# Patient Record
Sex: FEMALE | Race: OTHER | HISPANIC OR LATINO | Employment: UNEMPLOYED | URBAN - METROPOLITAN AREA
[De-identification: names, ages, dates, MRNs, and addresses within clinical notes are randomized per-mention and may not be internally consistent; named-entity substitution may affect disease eponyms.]

---

## 2020-02-10 ENCOUNTER — HOSPITAL ENCOUNTER (OUTPATIENT)
Dept: RADIOLOGY | Facility: HOSPITAL | Age: 55
Discharge: HOME | End: 2020-02-10
Attending: PODIATRIST
Payer: MEDICAID

## 2020-02-10 DIAGNOSIS — M77.50 ENTHESOPATHY, ANKLE AND TARSUS: ICD-10-CM

## 2020-02-10 DIAGNOSIS — S93.611A SPRAIN OF TARSAL LIGAMENT OF RIGHT FOOT: Primary | ICD-10-CM

## 2020-02-10 DIAGNOSIS — S93.611A SPRAIN OF TARSAL LIGAMENT OF RIGHT FOOT: ICD-10-CM

## 2020-02-10 PROCEDURE — 73630 X-RAY EXAM OF FOOT: CPT | Mod: 26,RT,, | Performed by: RADIOLOGY

## 2020-02-10 PROCEDURE — 73630 X-RAY EXAM OF FOOT: CPT | Mod: TC,FY,RT

## 2020-02-10 PROCEDURE — 73630 XR FOOT COMPLETE 3 VIEW RIGHT: ICD-10-PCS | Mod: 26,RT,, | Performed by: RADIOLOGY

## 2021-09-06 ENCOUNTER — HOSPITAL ENCOUNTER (EMERGENCY)
Facility: HOSPITAL | Age: 56
Discharge: HOME | End: 2021-09-06
Attending: INTERNAL MEDICINE
Payer: MEDICAID

## 2021-09-06 VITALS
HEART RATE: 66 BPM | OXYGEN SATURATION: 100 % | DIASTOLIC BLOOD PRESSURE: 79 MMHG | HEIGHT: 62 IN | RESPIRATION RATE: 19 BRPM | SYSTOLIC BLOOD PRESSURE: 140 MMHG | WEIGHT: 165 LBS | TEMPERATURE: 98 F | BODY MASS INDEX: 30.36 KG/M2

## 2021-09-06 DIAGNOSIS — J30.9 ALLERGIC RHINITIS, UNSPECIFIED SEASONALITY, UNSPECIFIED TRIGGER: ICD-10-CM

## 2021-09-06 DIAGNOSIS — U07.1 COVID-19 VIRUS INFECTION: ICD-10-CM

## 2021-09-06 DIAGNOSIS — H65.192 OTHER NON-RECURRENT ACUTE NONSUPPURATIVE OTITIS MEDIA OF LEFT EAR: Primary | ICD-10-CM

## 2021-09-06 LAB
B-HCG UR QL: NEGATIVE
CTP QC/QA: YES
CTP QC/QA: YES
POC RAPID STREP A: NEGATIVE
SARS-COV-2 RDRP RESP QL NAA+PROBE: NEGATIVE

## 2021-09-06 PROCEDURE — 81025 URINE PREGNANCY TEST: CPT | Mod: ER | Performed by: NURSE PRACTITIONER

## 2021-09-06 PROCEDURE — 25000003 PHARM REV CODE 250: Mod: ER | Performed by: NURSE PRACTITIONER

## 2021-09-06 PROCEDURE — U0002 COVID-19 LAB TEST NON-CDC: HCPCS | Mod: ER | Performed by: NURSE PRACTITIONER

## 2021-09-06 PROCEDURE — 99284 EMERGENCY DEPT VISIT MOD MDM: CPT | Mod: 25,ER

## 2021-09-06 RX ORDER — NYSTATIN AND TRIAMCINOLONE ACETONIDE 100000; 1 [USP'U]/G; MG/G
OINTMENT TOPICAL
COMMUNITY
Start: 2020-09-29 | End: 2021-09-29

## 2021-09-06 RX ORDER — DEXTROMETHORPHAN HYDROBROMIDE, GUAIFENESIN 5; 100 MG/5ML; MG/5ML
650 LIQUID ORAL EVERY 8 HOURS
Qty: 20 TABLET | Refills: 0 | Status: SHIPPED | OUTPATIENT
Start: 2021-09-06

## 2021-09-06 RX ORDER — CYCLOSPORINE 0.5 MG/ML
EMULSION OPHTHALMIC
COMMUNITY
Start: 2021-06-30

## 2021-09-06 RX ORDER — VALSARTAN 160 MG/1
160 TABLET ORAL
COMMUNITY
Start: 2021-07-01

## 2021-09-06 RX ORDER — ROSUVASTATIN CALCIUM 20 MG/1
20 TABLET, COATED ORAL
COMMUNITY
Start: 2021-07-01 | End: 2023-05-21

## 2021-09-06 RX ORDER — CETIRIZINE HYDROCHLORIDE 10 MG/1
10 TABLET ORAL DAILY
Qty: 30 TABLET | Refills: 0 | Status: SHIPPED | OUTPATIENT
Start: 2021-09-06 | End: 2022-09-06

## 2021-09-06 RX ORDER — DICYCLOMINE HYDROCHLORIDE 20 MG/1
20 TABLET ORAL EVERY 6 HOURS
COMMUNITY
Start: 2021-08-19

## 2021-09-06 RX ORDER — MONTELUKAST SODIUM 10 MG/1
10 TABLET ORAL
COMMUNITY
Start: 2021-07-01 | End: 2021-09-29

## 2021-09-06 RX ORDER — BUDESONIDE AND FORMOTEROL FUMARATE DIHYDRATE 160; 4.5 UG/1; UG/1
2 AEROSOL RESPIRATORY (INHALATION) 2 TIMES DAILY
COMMUNITY
Start: 2021-05-19

## 2021-09-06 RX ORDER — AMOXICILLIN AND CLAVULANATE POTASSIUM 875; 125 MG/1; MG/1
1 TABLET, FILM COATED ORAL 2 TIMES DAILY
Qty: 20 TABLET | Refills: 0 | Status: SHIPPED | OUTPATIENT
Start: 2021-09-06 | End: 2021-09-16

## 2021-09-06 RX ORDER — IBUPROFEN 600 MG/1
600 TABLET ORAL
Status: COMPLETED | OUTPATIENT
Start: 2021-09-06 | End: 2021-09-06

## 2021-09-06 RX ORDER — BENZONATATE 100 MG/1
100 CAPSULE ORAL 3 TIMES DAILY PRN
Qty: 20 CAPSULE | Refills: 0 | Status: SHIPPED | OUTPATIENT
Start: 2021-09-06 | End: 2021-09-16

## 2021-09-06 RX ORDER — FLUTICASONE PROPIONATE 50 MCG
1 SPRAY, SUSPENSION (ML) NASAL 2 TIMES DAILY PRN
Qty: 15 G | Refills: 0 | Status: SHIPPED | OUTPATIENT
Start: 2021-09-06

## 2021-09-06 RX ORDER — HYDROQUINONE 40 MG/G
CREAM TOPICAL 2 TIMES DAILY
COMMUNITY
Start: 2021-08-25

## 2021-09-06 RX ORDER — IBUPROFEN 600 MG/1
600 TABLET ORAL EVERY 6 HOURS PRN
Qty: 20 TABLET | Refills: 0 | Status: SHIPPED | OUTPATIENT
Start: 2021-09-06

## 2021-09-06 RX ORDER — CLOBETASOL PROPIONATE 0.46 MG/ML
1 SOLUTION TOPICAL DAILY
COMMUNITY
Start: 2021-03-30

## 2021-09-06 RX ORDER — LEVOCETIRIZINE DIHYDROCHLORIDE 5 MG/1
1 TABLET, FILM COATED ORAL NIGHTLY
COMMUNITY
Start: 2020-12-29 | End: 2021-12-29

## 2021-09-06 RX ORDER — METOPROLOL SUCCINATE 50 MG/1
50 TABLET, EXTENDED RELEASE ORAL DAILY
COMMUNITY
Start: 2021-07-25

## 2021-09-06 RX ORDER — FAMOTIDINE 40 MG/1
1 TABLET, FILM COATED ORAL DAILY
COMMUNITY
Start: 2021-03-22

## 2021-09-06 RX ADMIN — IBUPROFEN 600 MG: 600 TABLET ORAL at 04:09

## 2021-09-09 ENCOUNTER — NURSE TRIAGE (OUTPATIENT)
Dept: ADMINISTRATIVE | Facility: CLINIC | Age: 56
End: 2021-09-09

## 2022-05-20 ENCOUNTER — HOSPITAL ENCOUNTER (EMERGENCY)
Facility: HOSPITAL | Age: 57
Discharge: HOME | End: 2022-05-20
Attending: INTERNAL MEDICINE
Payer: MEDICAID

## 2022-05-20 VITALS
DIASTOLIC BLOOD PRESSURE: 88 MMHG | SYSTOLIC BLOOD PRESSURE: 174 MMHG | RESPIRATION RATE: 20 BRPM | WEIGHT: 165 LBS | OXYGEN SATURATION: 97 % | HEIGHT: 62 IN | BODY MASS INDEX: 30.36 KG/M2 | TEMPERATURE: 98 F | HEART RATE: 63 BPM

## 2022-05-20 DIAGNOSIS — R10.9 ABDOMINAL PAIN, UNSPECIFIED ABDOMINAL LOCATION: Primary | ICD-10-CM

## 2022-05-20 DIAGNOSIS — R10.13 EPIGASTRIC PAIN: ICD-10-CM

## 2022-05-20 LAB
ALBUMIN SERPL-MCNC: 3.9 G/DL (ref 3.3–5.5)
ALLENS TEST: ABNORMAL
ALP SERPL-CCNC: 59 U/L (ref 42–141)
BILIRUB SERPL-MCNC: 0.7 MG/DL (ref 0.2–1.6)
BILIRUBIN, POC UA: NEGATIVE
BLOOD, POC UA: ABNORMAL
BUN SERPL-MCNC: 10 MG/DL (ref 7–22)
CALCIUM SERPL-MCNC: 9.7 MG/DL (ref 8–10.3)
CHLORIDE SERPL-SCNC: 106 MMOL/L (ref 98–108)
CLARITY, POC UA: ABNORMAL
COLOR, POC UA: ABNORMAL
CREAT SERPL-MCNC: 0.5 MG/DL (ref 0.6–1.2)
GLUCOSE SERPL-MCNC: 127 MG/DL (ref 73–118)
GLUCOSE, POC UA: NEGATIVE
HCO3 UR-SCNC: 24.6 MMOL/L (ref 24–28)
KETONES, POC UA: ABNORMAL
LDH SERPL L TO P-CCNC: 0.49 MMOL/L (ref 0.5–2.2)
LEUKOCYTE EST, POC UA: NEGATIVE
NITRITE, POC UA: NEGATIVE
PCO2 BLDA: 38.2 MMHG (ref 35–45)
PH SMN: 7.42 [PH] (ref 7.35–7.45)
PH UR STRIP: 7.5 [PH]
PO2 BLDA: 43 MMHG (ref 40–60)
POC ALT (SGPT): 21 U/L (ref 10–47)
POC AST (SGOT): 26 U/L (ref 11–38)
POC BE: 0 MMOL/L
POC BETA-HCG (QUANT): <5 IU/L
POC CARDIAC TROPONIN I: 0 NG/ML
POC SATURATED O2: 79 % (ref 95–100)
POC TCO2: 26 MMOL/L (ref 24–29)
POC TCO2: 27 MMOL/L (ref 18–33)
POTASSIUM BLD-SCNC: 4.5 MMOL/L (ref 3.6–5.1)
PROTEIN, POC UA: NEGATIVE
PROTEIN, POC: 7.5 G/DL (ref 6.4–8.1)
SAMPLE: ABNORMAL
SAMPLE: NORMAL
SAMPLE: NORMAL
SITE: ABNORMAL
SODIUM BLD-SCNC: 140 MMOL/L (ref 128–145)
SPECIFIC GRAVITY, POC UA: 1.01
UROBILINOGEN, POC UA: 0.2 E.U./DL

## 2022-05-20 PROCEDURE — 81003 URINALYSIS AUTO W/O SCOPE: CPT | Mod: ER

## 2022-05-20 PROCEDURE — 63600175 PHARM REV CODE 636 W HCPCS: Mod: ER | Performed by: INTERNAL MEDICINE

## 2022-05-20 PROCEDURE — 25500020 PHARM REV CODE 255: Mod: ER | Performed by: INTERNAL MEDICINE

## 2022-05-20 PROCEDURE — 25000003 PHARM REV CODE 250: Mod: ER | Performed by: INTERNAL MEDICINE

## 2022-05-20 PROCEDURE — 99284 EMERGENCY DEPT VISIT MOD MDM: CPT | Mod: 25,ER

## 2022-05-20 PROCEDURE — 82803 BLOOD GASES ANY COMBINATION: CPT | Mod: ER

## 2022-05-20 PROCEDURE — 96375 TX/PRO/DX INJ NEW DRUG ADDON: CPT | Mod: ER

## 2022-05-20 PROCEDURE — 80053 COMPREHEN METABOLIC PANEL: CPT | Mod: ER

## 2022-05-20 PROCEDURE — 96361 HYDRATE IV INFUSION ADD-ON: CPT | Mod: ER

## 2022-05-20 PROCEDURE — 84702 CHORIONIC GONADOTROPIN TEST: CPT | Mod: ER

## 2022-05-20 PROCEDURE — 96374 THER/PROPH/DIAG INJ IV PUSH: CPT | Mod: ER,59

## 2022-05-20 PROCEDURE — 93005 ELECTROCARDIOGRAM TRACING: CPT | Mod: ER

## 2022-05-20 PROCEDURE — 93010 EKG 12-LEAD: ICD-10-PCS | Mod: ,,, | Performed by: INTERNAL MEDICINE

## 2022-05-20 PROCEDURE — 84484 ASSAY OF TROPONIN QUANT: CPT | Mod: ER

## 2022-05-20 PROCEDURE — 85025 COMPLETE CBC W/AUTO DIFF WBC: CPT | Mod: ER

## 2022-05-20 PROCEDURE — 93010 ELECTROCARDIOGRAM REPORT: CPT | Mod: ,,, | Performed by: INTERNAL MEDICINE

## 2022-05-20 RX ORDER — KETOROLAC TROMETHAMINE 30 MG/ML
30 INJECTION, SOLUTION INTRAMUSCULAR; INTRAVENOUS
Status: COMPLETED | OUTPATIENT
Start: 2022-05-20 | End: 2022-05-20

## 2022-05-20 RX ORDER — PROMETHAZINE HYDROCHLORIDE 25 MG/1
25 SUPPOSITORY RECTAL EVERY 6 HOURS PRN
Qty: 10 SUPPOSITORY | Refills: 0 | Status: SHIPPED | OUTPATIENT
Start: 2022-05-20

## 2022-05-20 RX ORDER — SUCRALFATE 1 G/1
1 TABLET ORAL 4 TIMES DAILY
COMMUNITY

## 2022-05-20 RX ORDER — ONDANSETRON 4 MG/1
4 TABLET, ORALLY DISINTEGRATING ORAL EVERY 12 HOURS PRN
Qty: 10 TABLET | Refills: 0 | Status: SHIPPED | OUTPATIENT
Start: 2022-05-20

## 2022-05-20 RX ORDER — SODIUM CHLORIDE 9 MG/ML
1000 INJECTION, SOLUTION INTRAVENOUS ONCE
Status: COMPLETED | OUTPATIENT
Start: 2022-05-20 | End: 2022-05-20

## 2022-05-20 RX ORDER — ONDANSETRON 2 MG/ML
8 INJECTION INTRAMUSCULAR; INTRAVENOUS
Status: COMPLETED | OUTPATIENT
Start: 2022-05-20 | End: 2022-05-20

## 2022-05-20 RX ADMIN — ONDANSETRON 8 MG: 2 INJECTION INTRAMUSCULAR; INTRAVENOUS at 03:05

## 2022-05-20 RX ADMIN — SODIUM CHLORIDE 1000 ML: 0.9 INJECTION, SOLUTION INTRAVENOUS at 03:05

## 2022-05-20 RX ADMIN — KETOROLAC TROMETHAMINE 30 MG: 30 INJECTION, SOLUTION INTRAMUSCULAR at 05:05

## 2022-05-20 RX ADMIN — IOHEXOL 100 ML: 300 INJECTION, SOLUTION INTRAVENOUS at 04:05

## 2022-05-20 NOTE — ED PROVIDER NOTES
Encounter Date: 5/20/2022       History     Chief Complaint   Patient presents with    Abdominal Pain     Pt c/o upper abd pain and L arm numbness for years; multiple work ups done; pain worse since around 10pm     56-year-old female presents to the emergency department complaining of generalized abdominal pain since 10:00 p.m..  Patient states she has had intermittent abdominal pain for several years, but pain worsened approximately 5 hours ago.  Patient's daughter, who is interpreting for the patient, states patient had 5 episodes of nonbloody/nonbilious emesis within the past 5 hours.  She denies chest pain/shortness of breath.    The history is provided by the patient. No  was used.     Review of patient's allergies indicates:   Allergen Reactions    Lisinopril Other (See Comments)     COUGH     Past Medical History:   Diagnosis Date    Asthma     Hyperlipemia     Hypertension      Past Surgical History:   Procedure Laterality Date    LUNG SURGERY       History reviewed. No pertinent family history.  Social History     Tobacco Use    Smoking status: Never Smoker    Smokeless tobacco: Never Used   Substance Use Topics    Alcohol use: Yes    Drug use: Never     Review of Systems   Constitutional: Negative for chills and fever.   Respiratory: Negative for cough and shortness of breath.    Cardiovascular: Negative for chest pain and leg swelling.   Gastrointestinal: Positive for abdominal pain, nausea and vomiting. Negative for anal bleeding, blood in stool and diarrhea.   All other systems reviewed and are negative.      Physical Exam     Initial Vitals [05/20/22 0312]   BP Pulse Resp Temp SpO2   (!) 174/88 63 20 97.6 °F (36.4 °C) 97 %      MAP       --         Physical Exam    Nursing note and vitals reviewed.  Constitutional: She is not diaphoretic. No distress.   HENT:   Head: Normocephalic and atraumatic.   Right Ear: External ear normal.   Left Ear: External ear normal.   Eyes:  Conjunctivae and EOM are normal. Pupils are equal, round, and reactive to light.   Neck:   Normal range of motion.  Cardiovascular: Normal rate, regular rhythm and normal heart sounds.   Pulmonary/Chest: Breath sounds normal. No respiratory distress. She has no wheezes.   Abdominal: Abdomen is soft. Bowel sounds are normal.   Musculoskeletal:         General: No tenderness or edema. Normal range of motion.      Cervical back: Normal range of motion.     Neurological: She is alert and oriented to person, place, and time. She has normal strength. She displays normal reflexes. No cranial nerve deficit or sensory deficit.   Skin: Skin is warm and dry. Capillary refill takes less than 2 seconds.   Psychiatric: She has a normal mood and affect. Thought content normal.         ED Course   Procedures  Labs Reviewed   POCT URINALYSIS W/O SCOPE - Abnormal; Notable for the following components:       Result Value    Ketones, UA 1+ (*)     Blood, UA 3+ (*)     All other components within normal limits   ISTAT PROCEDURE - Abnormal; Notable for the following components:    POC SATURATED O2 79 (*)     POC Lactate 0.49 (*)     All other components within normal limits   POCT CMP - Abnormal; Notable for the following components:    POC Creatinine 0.5 (*)     POC Glucose 127 (*)     All other components within normal limits   TROPONIN ISTAT   POCT URINALYSIS W/O SCOPE   POCT CBC   POCT CMP   POCT TROPONIN   POCT B-HCG (QUANT)   POCT B-HCG (QUANT)          Imaging Results           CT Abdomen Pelvis With Contrast (Final result)  Result time 05/20/22 04:52:44    Final result by Ifeanyi Boo MD (05/20/22 04:52:44)                 Impression:      There is a diaphragmatic hernia at the dome of the left hemidiaphragm through which a segment of the splenic flexure extends, into the thoracic cavity, as does a component of the mesenteric fat, without evidence for colonic inflammatory change or abnormal wall thickening.    Proximal to  the aforementioned diaphragmatic hernia there is mild distention of the right colon and transverse colon with air and stool, and distal to the hernia there is diminished caliber of the colon, however there is no marked colonic distention or inflammatory change, given the appearance correlation for signs or symptoms of intermittent or partial or early obstructive change is needed.  Complete obstruction is not thought present at this time.    2.4 cm mildly complex cyst of the left adnexa, ultrasound follow-up is recommended.    The lung bases demonstrate appearance of atelectasis and mild ground-glass infiltrates as well as areas of pleural plaques with calcification.    This report was flagged in Epic as abnormal.      Electronically signed by: Ifeanyi Boo  Date:    05/20/2022  Time:    04:52             Narrative:    EXAMINATION:  CT ABDOMEN PELVIS WITH CONTRAST    CLINICAL HISTORY:  Abdominal pain, acute, nonlocalized;    TECHNIQUE:  Low dose axial images, sagittal and coronal reformations were obtained from the lung bases to the pubic symphysis following the IV administration of 100 mL of Omnipaque 350 oral contrast was not utilized.  Single phase postcontrast CT examination of the abdomen and pelvis is submitted.    COMPARISON:  None.    FINDINGS:  The lung bases demonstrate atelectatic change and appearance that may relate to superimposed ground-glass infiltrates.  There are also areas of pleural thickening with calcification present, more prominent on the right.    There is a diaphragmatic hernia involving the left hemidiaphragm at the level of the dome of the diaphragm, this is best seen on sagittal image 50, coronal image 32.  There is extension of the splenic flexure through the diaphragmatic hernia, with mild air distention of the supradiaphragmatic segment of the splenic flexure.  There is no associated abnormal wall thickening or inflammatory change.  There is mild air and stool noted throughout the  colon proximal to this level, and the colon distal to this level is of diminished caliber compared to the right colon and transverse colon and therefore an early or partial obstructive process cannot be excluded however the diameter of the right colon and transverse colon is not abnormally distended and there is no inflammatory change.  There is no evidence for abnormal colonic wall thickening or inflammatory change.  On the coronal reconstruction imaging the diaphragmatic defect measures approximately 2.2 cm in the transverse dimension, on the  sagittal reconstruction imaging the diaphragmatic defect measures approximately 1.2 cm in the anterior to posterior dimension.    The stomach is decompressed, wall and fold thickening may relate to lack of distention, clinical correlation is needed to determine need for additional follow-up.    There is no evidence for pericholecystic or peripancreatic inflammatory change, mild diminished attenuation of the liver may relate to diffuse fatty infiltrate.  There is no evidence for acute process of the liver, gallbladder, pancreas, spleen or adrenal glands.  There is no evidence for hydronephrosis or obstructive uropathy or ureteral calculus bilaterally.  The abdominal aorta appears normal in caliber, demonstrates appropriate opacification.  Mild atherosclerotic calcification noted.  The urinary bladder appears unremarkable for degree of distention.  There is a 2.4 cm hypodense finding at the left adnexa measuring approximately 22 Hounsfield units, mildly greater in attenuation than a simple cyst, ultrasound follow-up is recommended.    There is no evidence for small bowel obstructive process.  The appendix is identified, it does not appear inflamed.  There is no evidence for free intraperitoneal air, there is no evidence for pneumatosis, portal venous air or mesenteric venous air.  The visualized osseous structures appear intact.                                 Medications    0.9%  NaCl infusion (0 mLs Intravenous Stopped 5/20/22 5963)   ondansetron injection 8 mg (8 mg Intravenous Given 5/20/22 1471)   iohexoL (OMNIPAQUE 300) injection 100 mL (100 mLs Intravenous Given 5/20/22 9559)   ketorolac injection 30 mg (30 mg Intravenous Given 5/20/22 3211)     Medical Decision Making:   ED Management:  CT of the abdomen and pelvis shows diaphragmatic hernia but no complete obstruction.  Patient states pain has resolved after Toradol and Zofran.  She was given instructions for abdominal pain and prescriptions for oral Zofran/rectal promethazine.  She was advised to follow-up with her primary care physician within the next 2 days for re-evaluation/return to the emergency department if condition worsens.                      Clinical Impression:   Final diagnoses:  [R10.13] Epigastric pain  [R10.9] Abdominal pain, unspecified abdominal location (Primary)          ED Disposition Condition    Discharge Stable        ED Prescriptions     Medication Sig Dispense Start Date End Date Auth. Provider    ondansetron (ZOFRAN-ODT) 4 MG TbDL Take 1 tablet (4 mg total) by mouth every 12 (twelve) hours as needed (Nausea). 10 tablet 5/20/2022  Guille Licea MD    promethazine (PHENERGAN) 25 MG suppository Place 1 suppository (25 mg total) rectally every 6 (six) hours as needed for Nausea. 10 suppository 5/20/2022  Guille Licea MD        Follow-up Information     Follow up With Specialties Details Why Contact Info    Chanell Garrido DPM Podiatry Schedule an appointment as soon as possible for a visit in 1 day For reevaluation [address] [facility] [address] [phone]             Guille Licea MD  05/20/22 9050

## 2023-05-21 ENCOUNTER — HOSPITAL ENCOUNTER (EMERGENCY)
Facility: HOSPITAL | Age: 58
Discharge: HOME | End: 2023-05-21
Attending: EMERGENCY MEDICINE
Payer: MEDICAID

## 2023-05-21 VITALS
WEIGHT: 130.06 LBS | RESPIRATION RATE: 18 BRPM | HEART RATE: 65 BPM | SYSTOLIC BLOOD PRESSURE: 127 MMHG | BODY MASS INDEX: 23.93 KG/M2 | DIASTOLIC BLOOD PRESSURE: 58 MMHG | HEIGHT: 62 IN | OXYGEN SATURATION: 98 % | TEMPERATURE: 98 F

## 2023-05-21 DIAGNOSIS — T78.40XA ALLERGIC REACTION, INITIAL ENCOUNTER: Primary | ICD-10-CM

## 2023-05-21 DIAGNOSIS — R21 RASH AND NONSPECIFIC SKIN ERUPTION: ICD-10-CM

## 2023-05-21 LAB
B-HCG UR QL: NEGATIVE
CTP QC/QA: YES

## 2023-05-21 PROCEDURE — 99283 EMERGENCY DEPT VISIT LOW MDM: CPT | Mod: ER

## 2023-05-21 PROCEDURE — 63600175 PHARM REV CODE 636 W HCPCS: Mod: ER

## 2023-05-21 PROCEDURE — 25000003 PHARM REV CODE 250: Mod: ER

## 2023-05-21 PROCEDURE — 81025 URINE PREGNANCY TEST: CPT | Mod: ER

## 2023-05-21 RX ORDER — FAMOTIDINE 20 MG/1
20 TABLET, FILM COATED ORAL
Status: COMPLETED | OUTPATIENT
Start: 2023-05-21 | End: 2023-05-21

## 2023-05-21 RX ORDER — PREDNISONE 20 MG/1
40 TABLET ORAL DAILY
Qty: 10 TABLET | Refills: 0 | Status: SHIPPED | OUTPATIENT
Start: 2023-05-21 | End: 2023-05-26

## 2023-05-21 RX ORDER — DIPHENHYDRAMINE HCL 25 MG
25 CAPSULE ORAL EVERY 6 HOURS PRN
Qty: 20 CAPSULE | Refills: 0 | Status: SHIPPED | OUTPATIENT
Start: 2023-05-21

## 2023-05-21 RX ORDER — FAMOTIDINE 20 MG/1
20 TABLET, FILM COATED ORAL 2 TIMES DAILY
Qty: 20 TABLET | Refills: 0 | Status: SHIPPED | OUTPATIENT
Start: 2023-05-21 | End: 2024-05-20

## 2023-05-21 RX ORDER — PREDNISONE 20 MG/1
60 TABLET ORAL
Status: COMPLETED | OUTPATIENT
Start: 2023-05-21 | End: 2023-05-21

## 2023-05-21 RX ORDER — DIPHENHYDRAMINE HCL 25 MG
50 CAPSULE ORAL
Status: COMPLETED | OUTPATIENT
Start: 2023-05-21 | End: 2023-05-21

## 2023-05-21 RX ADMIN — DIPHENHYDRAMINE HYDROCHLORIDE 50 MG: 25 CAPSULE ORAL at 01:05

## 2023-05-21 RX ADMIN — FAMOTIDINE 20 MG: 20 TABLET, FILM COATED ORAL at 01:05

## 2023-05-21 RX ADMIN — PREDNISONE 60 MG: 20 TABLET ORAL at 01:05

## 2023-05-21 NOTE — DISCHARGE INSTRUCTIONS

## 2023-05-21 NOTE — ED TRIAGE NOTES
Pt arrived to the ED via personal transport due to new onset of rash yesterday. Pt reports using new soap, Tamazight Spring, and has rash all over body. Pt has notable redness and itchiness. Pt denies taking any meds for symptoms. Denies nausea/vomiting. Hx of HTN. Alert and oriented x4.

## 2023-05-21 NOTE — ED PROVIDER NOTES
Encounter Date: 5/21/2023       History     Chief Complaint   Patient presents with    Rash     Rash to arms, legs, chest, abdomen. Airway patent, respirations unlabored.      57 year old female with past medical history of hyperlipidemia, hypertension presents to ED for emergent evaluation of a rash with associated itching that started at 12:00 p.m. yesterday.  Patient's daughter states the patient started using a new bar soap.  Patient's daughter denies any difficulty breathing, tongue swelling, chest pain, shortness of breath, fever, abdominal pain, nausea, facial swelling, vomiting, diarrhea, dysuria, hematuria.  Patient attempted Benadryl yesterday.  No other symptoms reported.    The history is provided by the patient and a relative. The history is limited by a language barrier. A  was used (patient's daughter).   Review of patient's allergies indicates:   Allergen Reactions    Lisinopril Other (See Comments)     COUGH     Past Medical History:   Diagnosis Date    Asthma     Hyperlipemia     Hypertension      Past Surgical History:   Procedure Laterality Date    LUNG SURGERY       No family history on file.  Social History     Tobacco Use    Smoking status: Never    Smokeless tobacco: Never   Substance Use Topics    Alcohol use: Yes    Drug use: Never     Review of Systems   Constitutional:  Negative for chills and fever.   HENT:  Negative for congestion, ear pain, facial swelling, rhinorrhea and sore throat.         (-) tongue swelling   Eyes:  Negative for redness.   Respiratory:  Negative for cough and shortness of breath.         (-) difficulty breathing   Cardiovascular:  Negative for chest pain.   Gastrointestinal:  Negative for abdominal pain, diarrhea, nausea and vomiting.   Genitourinary:  Negative for decreased urine volume, difficulty urinating, dysuria, frequency, hematuria and urgency.   Musculoskeletal:  Negative for back pain and neck pain.   Skin:  Positive for rash.    Neurological:  Negative for headaches.   Psychiatric/Behavioral:  Negative for confusion.      Physical Exam     Initial Vitals [05/21/23 1015]   BP Pulse Resp Temp SpO2   112/74 71 18 98.2 °F (36.8 °C) 99 %      MAP       --         Physical Exam    Nursing note and vitals reviewed.  Constitutional: She appears well-developed and well-nourished.  Non-toxic appearance. She does not appear ill.   HENT:   Head: Normocephalic and atraumatic.   Right Ear: Hearing, tympanic membrane, external ear and ear canal normal. Tympanic membrane is not perforated, not erythematous and not bulging.   Left Ear: Hearing, tympanic membrane, external ear and ear canal normal. Tympanic membrane is not perforated, not erythematous and not bulging.   Nose: Nose normal.   Mouth/Throat: Uvula is midline, oropharynx is clear and moist and mucous membranes are normal. No trismus in the jaw.   No tongue swelling.  No trismus.  Full range of motion of tongue.  No angioedema.   Eyes: Conjunctivae and EOM are normal.   Neck: Neck supple.   Normal range of motion.   Full passive range of motion without pain.     Cardiovascular:  Normal rate and regular rhythm.           Pulses:       Radial pulses are 2+ on the right side and 2+ on the left side.   Pulmonary/Chest: Effort normal and breath sounds normal. No accessory muscle usage. No respiratory distress. She has no decreased breath sounds.   Abdominal: Abdomen is soft. Bowel sounds are normal. She exhibits no distension. There is no abdominal tenderness. There is no rebound and no guarding.   Musculoskeletal:         General: Normal range of motion.      Cervical back: Full passive range of motion without pain, normal range of motion and neck supple. No rigidity.     Neurological: She is alert. No cranial nerve deficit.   Neuro intact.  Strength and sensation intact bilateral upper and lower extremities.   Skin: Skin is warm and dry. Rash noted.    Scattered erythematous wheals to patient's  bilateral upper extremities, bilateral lower extremities, chest, and back.     Psychiatric: She has a normal mood and affect.       ED Course   Procedures  Labs Reviewed   POCT URINE PREGNANCY          Imaging Results    None          Medications   predniSONE tablet 60 mg (60 mg Oral Given 5/21/23 1306)   diphenhydrAMINE capsule 50 mg (50 mg Oral Given 5/21/23 1306)   famotidine tablet 20 mg (20 mg Oral Given 5/21/23 1306)     Medical Decision Making:   ED Management:  This is a 57 year old female with past medical history of hyperlipidemia, hypertension presents to ED for emergent evaluation of a rash with associated itching that started at 12:00 p.m. yesterday.On physical exam, patient is well-appearing and in no acute distress.  Nontoxic appearing.  Lungs are clear to auscultation bilaterally.  Abdomen is soft and nontender.  No guarding, rigidity, rebound.  2+ radial pulses bilaterally.  Posterior oropharynx is not erythematous.  No edema or exudate.  Uvula midline.  Bilateral tympanic membrane is normal.  No erythema, bulging, or perforations.  Neuro intact.  Strength and sensation intact bilateral upper and lower extremities.  Scattered erythematous wheals to patient's bilateral upper extremities, bilateral lower extremities, chest, and back.  No tongue swelling.  No trismus.  Full range of motion of tongue.  No angioedema.  Benadryl, Pepcid, prednisone ordered.  Upon reassessment, patient denies any worsening or new symptoms.  Will discharge patient on a short course of prednisone, Pepcid, Benadryl.  Urged prompt follow-up with PCP for further evaluation.    Strict return precautions given. I discussed with the patient/family the diagnosis, treatment plan, indications for return to the emergency department, and for expected follow-up. The patient/family verbalized an understanding. The patient/family is asked if there are any questions or concerns. We discuss the case, until all issues are addressed to the  patient/family's satisfaction. Patient/family understands and is agreeable to the plan. Patient is stable and ready for discharge.                          Clinical Impression:   Final diagnoses:  [T78.40XA] Allergic reaction, initial encounter (Primary)  [R21] Rash and nonspecific skin eruption        ED Disposition Condition    Discharge Stable          ED Prescriptions       Medication Sig Dispense Start Date End Date Auth. Provider    predniSONE (DELTASONE) 20 MG tablet Take 2 tablets (40 mg total) by mouth once daily. for 5 days 10 tablet 5/21/2023 5/26/2023 Bernardo Schulz PA-C    diphenhydrAMINE (BENADRYL) 25 mg capsule Take 1 capsule (25 mg total) by mouth every 6 (six) hours as needed for Itching or Allergies. 20 capsule 5/21/2023 -- Bernardo Schulz PA-C    famotidine (PEPCID) 20 MG tablet Take 1 tablet (20 mg total) by mouth 2 (two) times daily. 20 tablet 5/21/2023 5/20/2024 Bernardo Schulz PA-C          Follow-up Information       Follow up With Specialties Details Why Contact Info    Chanell Garrido DPM Podiatry In 2 days for further evaluation [address] [facility] [address] [phone]      [place] [facility] ED Emergency Medicine In 2 days If symptoms worsen [facility] [facility] [address] [phone]             Bernardo Schulz PA-C  05/21/23 9544

## 2024-05-21 DIAGNOSIS — Z98.890 S/P TRIGGER FINGER RELEASE: Primary | ICD-10-CM

## 2024-05-27 ENCOUNTER — CLINICAL SUPPORT (OUTPATIENT)
Dept: REHABILITATION | Facility: HOSPITAL | Age: 59
End: 2024-05-27
Payer: MEDICAID

## 2024-05-27 DIAGNOSIS — R52 PAIN: ICD-10-CM

## 2024-05-27 DIAGNOSIS — R60.0 LOCALIZED EDEMA: Primary | ICD-10-CM

## 2024-05-27 DIAGNOSIS — M25.60 DECREASED RANGE OF MOTION: ICD-10-CM

## 2024-05-27 PROCEDURE — 97165 OT EVAL LOW COMPLEX 30 MIN: CPT

## 2024-05-27 NOTE — PATIENT INSTRUCTIONS
[facility], OCCUPATIONAL THERAPY  HOME EXERCISE PROGRAM    Curt el masaje por 2-3 minutos 4 veces al día:    Curt 10 repeticiones de cada ejercicio 4 veces cada día:          Dariela Katz OTR/L

## 2024-05-27 NOTE — PROGRESS NOTES
[facility]  Occupational Therapy Initial Evaluation    Date: 5/27/2024  Name: Michelle Mendoza  Clinic Number: [id number]    Therapy Diagnosis:   Encounter Diagnoses   Name Primary?    Decreased range of motion     Localized edema Yes    Pain      Physician: Phuc Bolaños PA      Physician Orders: Procedure Performed: R LF trigger finger releaseEval & treat right upper extremity.   Medical Diagnosis: Z98.890 (ICD-10-CM) - S/P trigger finger release   Surgical Procedure and Date: 5/2/2024, TF release    Evaluation Date: 5/27/2024  Insurance Authorization Period Expiration: 5/21/2025   Plan of Care Expiration: 8 weeks; 7/26/2024  Date of Return to MD: 7/25/2024  Visit # / Visits authorized: 1 / 1  FOTO: (date and score)    FOTO Lobby Code:       Precautions:  Standard      Time In: 11:00 AM  Time Out: 12:00 pM  Total Appointment Time (timed & untimed codes): 60 minutes        SUBJECTIVE     Date of Onset: 5/2/2024      History of Current Condition/Mechanism of Injury: Michelle reports: sx on 5/2/2024. Had trigger finger for 5-6 months prior. No therapy. Has not used hand since date of surgery. Sutures removed last week.       Falls: 0    Involved Side: Right  Dominant Side: Right  Imaging:    Prior Therapy: No   Occupation:  unemployed  Working presently: unemployed  Duties: works inside the house     Functional Limitations/Social History:    Previous functional status includes: Independent with all ADLs.     Current Functional Status   Home/Living environment: lives with their family      Limitation of Functional Status as follows:   ADLs/IADLs:     - Feeding: uses L hand to feed self     - Bathing: uses L hand, difficulty areas to reach my daughter has to help     - Dressing/Grooming: unable to azael bra because of R hand     - Driving: n/a      Leisure: plants, taking care of house and kids       Pain:  Functional Pain Scale Rating 0-10: Current 6/10, worst 8/10 (forearm pronated),  best 5/10   Location: incision sx  Description: Tight, Tingling, Numb, and Sharp  Aggravating Factors: use with forearm in pronated position   Easing Factors: pain medication       Patient's Goals for Therapy: be able to make full fist     Medical History:   Past Medical History:   Diagnosis Date    Asthma     Hyperlipemia     Hypertension        Surgical History:    has a past surgical history that includes Lung surgery.    Medications:   has a current medication list which includes the following prescription(s): acetaminophen, budesonide-formoterol 160-4.5 mcg, cetirizine, clobetasol, restasis, dicyclomine, diphenhydramine, famotidine, fluticasone propionate, hydroquinone, ibuprofen, levocetirizine, metoprolol succinate, nystatin-triamcinolone, pazeo, ondansetron, promethazine, rosuvastatin, sucralfate, and valsartan.    Allergies:   Review of patient's allergies indicates:   Allergen Reactions    Lisinopril Other (See Comments)     COUGH          OBJECTIVE     Observation/Appearance: edema in digits, flexion in Mps     Edema. Measured in centimeters.   5/31/2024 5/31/2024    Left Right   2in. Above elbow     2in. Below elbow     Wrist Crease     Figure of 8     MCPs       Edema. Measured in centimeters.   5/31/2024 5/31/2024    Left Right   Index:       P1 5.5 cm 6.1 cm    PIP     P2      DIP     P3     Long:       P1 5.3 cm 6.5 cm    PIP     P2 4.7 cm 5.6 cm    DIP     P3     Ring:       P1            5.1 cm 6.0 cm    PIP                P2                  DIP     P3     Small:        P1                 PIP            P2             DIP     P3     Thumb:     Prox. Phalanx     IP     Distal Phalanx       Elbow and Wrist ROM. Measured in degrees.   5/31/2024 5/31/2024    Left Right   Elbow Ext/Flex     Supination/Pronation     Wrist Ext/Flex 60 / 62 54 / 49   Wrist RD/UD       Hand ROM. Measured in degrees.   5/31/2024 5/31/2024    Left Right        Index: MP  Able to make full composite fist  29 / 35               PIP      58              DIP  25              PATRICIO          Long:  MP  33 / 45              PIP  44              DIP  28              PATRICIO          Ring:   MP  27 / 33              PIP  67              DIP  38              PATRICIO          Small:  MP  29               PIP  62               DIP  28              PATRICIO          Thumb: MP                  IP         Rad ADD/ABD         Pal ADD/ABD            Opposition          Strength (Dynamometer) and Pinch Strength (Pinch Gauge)  Measured in pounds.   5/31/2024 5/31/2024    Left Right   Rung II  deferred   Key Pinch     3pt Pinch     2pt Pinch       Sensation   5/27/2024 5/27/2024    Left Right   [place] Bessy     Normal 1.65-2.83     Diminished Light Touch 3.22-3.61     Diminished Protective 3.84-4.31     Loss of Protective 4.56-6.65     Untestable >6.65     2 Point Discrimination     Static     Dynamic       Manual Muscle Test   5/31/2024 5/31/2024    Left Right   Wrist Extension      Wrist Flexion     Radial Deviation     Ulnar Deviation     Supination     Pronation     Elbow Extension     Elbow Flexion         Limitation/Restriction for FOTO initial eval; Survey    Therapist reviewed FOTO scores for Michelle Mendoza on 5/27/2024.   FOTO documents entered into [employer] - see Media section.    Limitation Score: %         Treatment   Total Treatment time (time-based codes) separate from Evaluation: 21 minutes    Michelle received the treatments listed below:       Supervised modalities after being cleared for contradictions: Hot Pack - 8 minutes       Manual therapy techniques: Manual Lymphatic Drainage were applied to the: R hand for 2-3 minutes, including:  Demonstrated retrograde massage       Therapeutic exercises to develop ROM for 10 minutes, including:  Flexor tendon glides x 10 reps   Joint blocking  Digit extension   Digit abduction/adduction       Patient Education and Home Exercises      Education provided:   - increase light use with hand  - HEP frequency      Written Home Exercises Provided: yes.  Exercises were reviewed and Michelle was able to demonstrate them prior to the end of the session.  Michelle demonstrated good  understanding of the education provided. See EMR under Patient Instructions for exercises provided during therapy sessions.     Pt was advised to perform these exercises free of pain, and to stop performing them if pain occurs.    Patient/Family Education: role of OT, goals for OT, scheduling/cancellations - pt verbalized understanding. Discussed insurance limitations with patient.    ASSESSMENT     Michelle Mendoza is a 58 y.o. female referred to outpatient occupational therapy and presents with a medical diagnosis of s/p trigger finger release.  Patient presents with the following therapy deficits: Decreased ROM, Decreased  strength, Decreased pinch strength, Decreased muscle strength, Decreased functional hand use, Increased pain, Edema, Joint Stiffness, and Scar Adhesions and demonstrates limitations as described in the chart below. Following medical record review it is determined that pt will benefit from occupational therapy services in order to maximize pain free and/or functional use of right hand. The following goals were discussed with the patient and patient is in agreement with them as to be addressed in the treatment plan. The patient's rehab potential is Fair.     Anticipated barriers to occupational therapy:    Pt has no cultural, educational or language barriers to learning provided.  Medical Necessity is demonstrated by the following  Occupational Profile/History  Co-morbidities and personal factors that may impact the plan of care [x] LOW: Brief chart review  [] MODERATE: Expanded chart review   [] HIGH: Extensive chart review    Moderate / High Support Documentation:      Examination  Performance deficits relating to physical, cognitive or psychosocial skills that result in activity limitations and/or participation restrictions  []  LOW: addressing 1-3 Performance deficits  [x] MODERATE: 3-5 Performance deficits  [] HIGH: 5+ Performance deficits (please support below)    Moderate / High Support Documentation:    Physical:  Muscle Power/Strength  Muscle Endurance  Skin Integrity/Scar Formation  Edema   Strength  Pinch Strength  Fine Motor Coordination  Pain    Cognitive:  No Deficits    Psychosocial:    Habits  Routines  Rituals     Treatment Options [] LOW: Limited options  [x] MODERATE: Several options  [] HIGH: Multiple options      Decision Making/ Complexity Score: low           Goals:   The following goals were discussed with the patient and patient is in agreement with them as to be addressed in the treatment plan.   Long term goals: (by d/c)  1)  Patient to be IND with HEP and modalities for pain management  2)  Increase ROM 5-10 degrees to increase functional hand use for ADLs/leisure activities  3)   Increase  strength 5-8 lbs. to carry laundry, carry groceries, sweep, and increase functional independence of right hand for ADLs/iADLs  4)   Increase pinch 3-4  psis for  Increase in functional independence in ADLs/iADLs such as manipulating fasteners and opening containers  5) Patient to score at 50%  or less on FOTO to demonstrate improved perception of functional rightUE Use.            Short term Goals: ( 4 weeks)   1)  Patient to be IND with HEP and modalities for pain management  2)  Increase ROM 3-5 degrees to increase functional hand use for ADLs/leisure activities  3)   Increase  strength 3-5 lbs. to carry laundry, carry groceries, sweep, and increase functional independence of right hand for ADLs/iADLs  4)   Increase pinch 1-3 psis for Increase in functional independence in ADLs/iADLs such as manipulating fasteners and opening containers  5)   Patient to score at 35%  or less on FOTO to demonstrate improved perception of functional right UE Use.        PLAN   Plan of Care Certification: 5/27/2024 to 7/26/2024.      Outpatient Occupational Therapy 2 times weekly for 8 weeks to include the following interventions: Paraffin, Fluidotherapy, Manual therapy/joint mobilizations, Modalities for pain management, US 3 mhz, Therapeutic exercises/activities., Strengthening, Orthotic Fabrication/Fit/Training, Edema Control, Scar Management, and Energy Conservation.      Darieal Katz OT      I CERTIFY THE NEED FOR THESE SERVICES FURNISHED UNDER THIS PLAN OF TREATMENT AND WHILE UNDER MY CARE  Physician's comments:      Physician's Signature: ___________________________________________________

## 2024-05-29 ENCOUNTER — CLINICAL SUPPORT (OUTPATIENT)
Dept: REHABILITATION | Facility: HOSPITAL | Age: 59
End: 2024-05-29
Payer: MEDICAID

## 2024-05-29 DIAGNOSIS — R52 PAIN: ICD-10-CM

## 2024-05-29 DIAGNOSIS — R60.0 LOCALIZED EDEMA: Primary | ICD-10-CM

## 2024-05-29 PROCEDURE — 97530 THERAPEUTIC ACTIVITIES: CPT

## 2024-05-29 NOTE — PROGRESS NOTES
[facility]  Occupational Therapy Treatment Note     Date: 5/29/2024  Name: Michelle Mendoza  Clinic Number: [id number]    Therapy Diagnosis:   Encounter Diagnoses   Name Primary?    Localized edema Yes    Pain      Physician: Phuc Bolaños PA          Subjective       Patient reports: hurts when the fingers are moving   She was compliant with home exercise program given last session.   Response to previous treatment:  Functional change:     Pain: 6/10  Location: right hands      Objective     Objective Measures updated at progress report unless specified.    Treatment     Michelle received the treatments listed below:          Patient Education and Home Exercises     Education provided:   -   - Progress towards goals     Written Home Exercises Provided: Patient instructed to cont prior HEP.  Exercises were reviewed and Michelle was able to demonstrate them prior to the end of the session.  Michelle demonstrated good  understanding of the home exercise program provided. See electronic medical record under Patient Instructions for exercises provided during therapy sessions.       Assessment          Michelle  progressing well towards her goals and there are no updates to goals at this time. Pt prognosis is .     Patient will continue to benefit from skilled outpatient occupational therapy to address the deficits listed in the problem list on initial evaluation provide patient/family education and to maximize patient's level of independence in the home and community environment.     Patient's spiritual, cultural and educational needs considered and patient agreeable to plan of care and goals.    Anticipated barriers to occupational therapy:     Goals:      Plan     Updates/Grading for next session:     Dariela Katz OT   5/29/2024

## 2024-05-30 PROBLEM — M25.60 DECREASED RANGE OF MOTION: Status: ACTIVE | Noted: 2024-05-30

## 2024-05-31 PROBLEM — R52 PAIN: Status: ACTIVE | Noted: 2024-05-31

## 2024-05-31 PROBLEM — R60.0 LOCALIZED EDEMA: Status: ACTIVE | Noted: 2024-05-31

## 2024-06-03 ENCOUNTER — CLINICAL SUPPORT (OUTPATIENT)
Dept: REHABILITATION | Facility: HOSPITAL | Age: 59
End: 2024-06-03
Payer: MEDICAID

## 2024-06-03 DIAGNOSIS — R60.0 LOCALIZED EDEMA: Primary | ICD-10-CM

## 2024-06-03 DIAGNOSIS — R52 PAIN: ICD-10-CM

## 2024-06-03 PROCEDURE — 97530 THERAPEUTIC ACTIVITIES: CPT

## 2024-06-03 NOTE — PROGRESS NOTES
"  [facility]  Occupational Therapy Treatment Note     Date: 6/3/2024  Name: Michelle Mendoza  Clinic Number: [id number]    Therapy Diagnosis:   Encounter Diagnoses   Name Primary?    Localized edema Yes    Pain        Physician: Phuc Bolaños PA    Physician Orders: Procedure Performed: R LF trigger finger releaseEval & treat right upper extremity.   Medical Diagnosis: Z98.890 (ICD-10-CM) - S/P trigger finger release   Surgical Procedure and Date: 5/2/2024, TF release    Evaluation Date: 5/27/2024  Insurance Authorization Period Expiration: 5/21/2025   Plan of Care Expiration: 8 weeks; 7/26/2024  Date of Return to MD: 7/25/2024  Visit # / Visits authorized: 1 / 1  FOTO: (date and score)     FOTO Lobby Code:         Precautions:  Standard        Time In:    02:10  PM  Time Out:       03:00     PM  Total Appointment Time (timed & untimed codes): 50    minutes  Billable:       [id number]     Subjective   : [id number]      Patient reports: "feel better. Hurts more when I bend fingers"  She was compliant with home exercise program given last session.   Response to previous treatment: decreased pain  Functional change: able to put hair in a ponytail     Pain: 4/10 (at rest) 8/10 (with movement)   Location: right hands      Objective     Objective Measures updated at progress report unless specified.    Observation/Appearance: edema in digits, flexion in Mps      Edema. Measured in centimeters.    5/31/2024 5/31/2024     Left Right   2in. Above elbow       2in. Below elbow       Wrist Crease       Figure of 8       MCPs          Edema. Measured in centimeters.    5/31/2024 5/31/2024     Left Right   Index:         P1 5.5 cm 6.1 cm    PIP       P2        DIP       P3       Long:         P1 5.3 cm 6.5 cm    PIP       P2 4.7 cm 5.6 cm    DIP       P3       Ring:         P1            5.1 cm 6.0 cm    PIP                  P2                    DIP       P3       Small:          P1               "     PIP              P2               DIP       P3       Thumb:       Prox. Phalanx       IP       Distal Phalanx          Elbow and Wrist ROM. Measured in degrees.    5/31/2024 5/31/2024     Left Right   Elbow Ext/Flex       Supination/Pronation       Wrist Ext/Flex 60 / 62 54 / 49   Wrist RD/UD          Hand ROM. Measured in degrees.    5/31/2024 5/31/2024     Left Right           Index: MP  Able to make full composite fist  29 / 35              PIP       58              DIP   25              PATRICIO               Long:  MP   33 / 45              PIP   44              DIP   28              PATRICIO               Ring:   MP   27 / 33              PIP   67              DIP   38              PATRICIO               Small:  MP   29               PIP   62               DIP   28              PATRICIO               Thumb: MP                    IP           Rad ADD/ABD           Pal ADD/ABD              Opposition              Strength (Dynamometer) and Pinch Strength (Pinch Gauge)  Measured in pounds.    5/31/2024 5/31/2024     Left Right   Rung II   deferred   Key Pinch       3pt Pinch       2pt Pinch          Sensation    5/27/2024 5/27/2024     Left Right   [place] Bessy       Normal 1.65-2.83       Diminished Light Touch 3.22-3.61       Diminished Protective 3.84-4.31       Loss of Protective 4.56-6.65       Untestable >6.65       2 Point Discrimination       Static       Dynamic          Manual Muscle Test    5/31/2024 5/31/2024     Left Right   Wrist Extension        Wrist Flexion       Radial Deviation       Ulnar Deviation       Supination       Pronation       Elbow Extension       Elbow Flexion             Limitation/Restriction for FOTO initial eval; Survey     Therapist reviewed FOTO scores for Michelle Mendoza on 5/27/2024.   FOTO documents entered into [employer] - see Media section.     Limitation Score: %       Treatment     Michelle received the treatments listed below:        Therepeutic exercise: 25  - isospheres (2 min)   -  digit extension (1 min)   - joint blocking x 10 reps each   - hook grasp x 10 reps (2 sets)   - wrist flex/ext x 10 reps (2 sets)     \Manual therapy: 15 min   Retrograde massage   Thenar/hypothenar using tissue tool   Fascia gliding tissue tool LF volar/dorsal    Neuro re-ed: 5 min   - vibration ball palmar tissue (5 min)      Patient Education and Home Exercises     Education provided:   -   - Progress towards goals     Written Home Exercises Provided: Patient instructed to cont prior HEP.  Exercises were reviewed and Michelle was able to demonstrate them prior to the end of the session.  Michelle demonstrated good  understanding of the home exercise program provided. See electronic medical record under Patient Instructions for exercises provided during therapy sessions.       Assessment        Increased kandice with manual today however cont to be mod hypersensitive surrounding incision site. Pt is motivated to return to full function. Mod edema in LF limiting PIP and DIP     Michelle  progressing well towards her goals and there are no updates to goals at this time. Pt prognosis is .     Patient will continue to benefit from skilled outpatient occupational therapy to address the deficits listed in the problem list on initial evaluation provide patient/family education and to maximize patient's level of independence in the home and community environment.     Patient's spiritual, cultural and educational needs considered and patient agreeable to plan of care and goals.    Anticipated barriers to occupational therapy:     Goals:  Long term goals: (by d/c)  1)  Patient to be IND with HEP and modalities for pain management  2)  Increase ROM 5-10 degrees to increase functional hand use for ADLs/leisure activities  3)   Increase  strength 5-8 lbs. to carry laundry, carry groceries, sweep, and increase functional independence of right hand for ADLs/iADLs  4)   Increase pinch 3-4  psis for  Increase in functional independence  in ADLs/iADLs such as manipulating fasteners and opening containers  5) Patient to score at %  or less on FOTO to demonstrate improved perception of functional rightUE Use.            Short term Goals: ( 4 weeks)   1)  Patient to be IND with HEP and modalities for pain management  2)  Increase ROM 3-5 degrees to increase functional hand use for ADLs/leisure activities  3)   Increase  strength 3-5 lbs. to carry laundry, carry groceries, sweep, and increase functional independence of right hand for ADLs/iADLs  4)   Increase pinch 1-3 psis for Increase in functional independence in ADLs/iADLs such as manipulating fasteners and opening containers  5)   Patient to score at %  or less on FOTO to demonstrate improved perception of functional right UE Use.      Plan     Updates/Grading for next session:     Dariela Katz OT   6/3/2024

## 2024-06-10 ENCOUNTER — CLINICAL SUPPORT (OUTPATIENT)
Dept: REHABILITATION | Facility: HOSPITAL | Age: 59
End: 2024-06-10
Payer: MEDICAID

## 2024-06-10 DIAGNOSIS — R60.0 LOCALIZED EDEMA: Primary | ICD-10-CM

## 2024-06-10 DIAGNOSIS — R52 PAIN: ICD-10-CM

## 2024-06-10 PROCEDURE — 97530 THERAPEUTIC ACTIVITIES: CPT

## 2024-06-10 NOTE — PROGRESS NOTES
"  [facility]  Occupational Therapy Treatment Note     Date: 6/10/2024  Name: Michelle Mendoza  Clinic Number: [id number]    Therapy Diagnosis:   No diagnosis found.      Physician: Phuc Bolaños PA    Physician Orders: Procedure Performed: R LF trigger finger releaseEval & treat right upper extremity.   Medical Diagnosis: Z98.890 (ICD-10-CM) - S/P trigger finger release   Surgical Procedure and Date: 5/2/2024, TF release    Evaluation Date: 5/27/2024  Insurance Authorization Period Expiration: 5/21/2025   Plan of Care Expiration: 8 weeks; 7/26/2024  Date of Return to MD: 7/25/2024  Visit # / Visits authorized: 2 / 15  FOTO: (date and score)     FOTO [place]restes Code:         Precautions:  Standard        Time In:   ***  PM  Time Out:  *** PM  Total Appointment Time (timed & untimed codes): ***   minutes  Billable:       [id number]     Subjective   : [id number] ***      Patient reports: "feel better. Hurts more when I bend fingers" ***  She was compliant with home exercise program given last session.   Response to previous treatment: decreased pain  Functional change: able to put hair in a ponytail     Pain: 4/10 (at rest) 8/10 (with movement)   Location: right hands      Objective     Objective Measures updated at progress report unless specified.    Observation/Appearance: edema in digits, flexion in Mps      Edema. Measured in centimeters.    5/31/2024 5/31/2024     Left Right   2in. Above elbow       2in. Below elbow       Wrist Crease       Figure of 8       MCPs          Edema. Measured in centimeters.    5/31/2024 5/31/2024     Left Right   Index:         P1 5.5 cm 6.1 cm    PIP       P2        DIP       P3       Long:         P1 5.3 cm 6.5 cm    PIP       P2 4.7 cm 5.6 cm    DIP       P3       Ring:         P1            5.1 cm 6.0 cm    PIP                  P2                    DIP       P3       Small:          P1                   PIP              P2               DIP       P3   "     Thumb:       Prox. Phalanx       IP       Distal Phalanx          Elbow and Wrist ROM. Measured in degrees.    5/31/2024 5/31/2024     Left Right   Elbow Ext/Flex       Supination/Pronation       Wrist Ext/Flex 60 / 62 54 / 49   Wrist RD/UD          Hand ROM. Measured in degrees.    5/31/2024 5/31/2024     Left Right           Index: MP  Able to make full composite fist  29 / 35              PIP       58              DIP   25              PATRICIO               Long:  MP   33 / 45              PIP   44              DIP   28              PATRICIO               Ring:   MP   27 / 33              PIP   67              DIP   38              PATRICIO               Small:  MP   29               PIP   62               DIP   28              PATRICIO               Thumb: MP                    IP           Rad ADD/ABD           Pal ADD/ABD              Opposition              Strength (Dynamometer) and Pinch Strength (Pinch Gauge)  Measured in pounds.    5/31/2024 5/31/2024     Left Right   Rung II   deferred   Key Pinch       3pt Pinch       2pt Pinch          Sensation    5/27/2024 5/27/2024     Left Right   [place] Bess[place]       Normal 1.65-2.83       Diminished Light Touch 3.22-3.61       Diminished Protective 3.84-4.31       Loss of Protective 4.56-6.65       Untestable >6.65       2 Point Discrimination       Static       Dynamic          Manual Muscle Test    5/31/2024 5/31/2024     Left Right   Wrist Extension        Wrist Flexion       Radial Deviation       Ulnar Deviation       Supination       Pronation       Elbow Extension       Elbow Flexion             Limitation/Restriction for FOTO initial eval; Survey     Therapist reviewed FOTO scores for Michelle Douglasao on 5/27/2024.   FOTO documents entered into [employer] - see Media section.     Limitation Score: %       Treatment     Michelle received the treatments listed below:        Therepeutic exercise: 25  - isospheres (2 min)   - digit extension (1 min)   - joint blocking x 10 reps  each   - hook grasp x 10 reps (2 sets)   - wrist flex/ext x 10 reps (2 sets)     \Manual therapy: 15 min   Retrograde massage   Thenar/hypothenar using tissue tool   Fascia gliding tissue tool LF volar/dorsal    Neuro re-ed: 5 min   - vibration ball palmar tissue (5 min)      Patient Education and Home Exercises     Education provided:   -   - Progress towards goals     Written Home Exercises Provided: Patient instructed to cont prior HEP.  Exercises were reviewed and Michelle was able to demonstrate them prior to the end of the session.  Michelle demonstrated good  understanding of the home exercise program provided. See electronic medical record under Patient Instructions for exercises provided during therapy sessions.       Assessment        Increased kandice with manual today however cont to be mod hypersensitive surrounding incision site. Pt is motivated to return to full function. Mod edema in LF limiting PIP and DIP ***    Michelle  progressing well towards her goals and there are no updates to goals at this time. Pt prognosis is .     Patient will continue to benefit from skilled outpatient occupational therapy to address the deficits listed in the problem list on initial evaluation provide patient/family education and to maximize patient's level of independence in the home and community environment.     Patient's spiritual, cultural and educational needs considered and patient agreeable to plan of care and goals.    Anticipated barriers to occupational therapy:     Goals:  Long term goals: (by d/c)  1)  Patient to be IND with HEP and modalities for pain management  2)  Increase ROM 5-10 degrees to increase functional hand use for ADLs/leisure activities  3)   Increase  strength 5-8 lbs. to carry laundry, carry groceries, sweep, and increase functional independence of right hand for ADLs/iADLs  4)   Increase pinch 3-4  psis for  Increase in functional independence in ADLs/iADLs such as manipulating fasteners and  opening containers  5) Patient to score at %  or less on FOTO to demonstrate improved perception of functional rightUE Use.            Short term Goals: ( 4 weeks)   1)  Patient to be IND with HEP and modalities for pain management  2)  Increase ROM 3-5 degrees to increase functional hand use for ADLs/leisure activities  3)   Increase  strength 3-5 lbs. to carry laundry, carry groceries, sweep, and increase functional independence of right hand for ADLs/iADLs  4)   Increase pinch 1-3 psis for Increase in functional independence in ADLs/iADLs such as manipulating fasteners and opening containers  5)   Patient to score at %  or less on FOTO to demonstrate improved perception of functional right UE Use.      Plan     Updates/Grading for next session:     ISADORA Mehta   6/10/2024

## 2024-06-10 NOTE — PROGRESS NOTES
"      [facility]  Occupational Therapy Treatment Note     Date: 6/10/2024  Name: Michelle Mendoza  Clinic Number: [id number]    Therapy Diagnosis:   Encounter Diagnoses   Name Primary?    Localized edema Yes    Pain          Physician: Phuc Bolaños PA    Physician Orders: Procedure Performed: R LF trigger finger releaseEval & treat right upper extremity.   Medical Diagnosis: Z98.890 (ICD-10-CM) - S/P trigger finger release   Surgical Procedure and Date: 5/2/2024, TF release    Evaluation Date: 5/27/2024  Insurance Authorization Period Expiration: 5/21/2025   Plan of Care Expiration: 8 weeks; 7/26/2024  Date of Return to MD: 7/25/2024  Visit # / Visits authorized: 1 / 1  FOTO: (date and score)     FOTO Lobby Code:         Precautions:  Standard        Time In:    04:00  PM  Time Out:       04:55     PM  Total Appointment Time (timed & untimed codes):  55     minutes            Subjective     : [id number]    Patient reports: "when I move it I feel discomfort. But I don't feel it if I don't move it"   She was compliant with home exercise program given last session.   Response to previous treatment: decreased pain  Functional change: able to put hair in a ponytail     Pain: 4/10 (movement)  Location: right hands      Objective     Objective Measures updated at progress report unless specified.    Observation/Appearance: edema in digits, flexion in Mps      Edema. Measured in centimeters.    5/31/2024 5/31/2024     Left Right   2in. Above elbow       2in. Below elbow       Wrist Crease       Figure of 8       MCPs          Edema. Measured in centimeters.    5/31/2024 5/31/2024     Left Right   Index:         P1 5.5 cm 6.1 cm    PIP       P2        DIP       P3       Long:         P1 5.3 cm 6.5 cm    PIP       P2 4.7 cm 5.6 cm    DIP       P3       Ring:         P1            5.1 cm 6.0 cm    PIP                  P2                    DIP       P3       Small:          P1              "      PIP              P2               DIP       P3       Thumb:       Prox. Phalanx       IP       Distal Phalanx          Elbow and Wrist ROM. Measured in degrees.    5/31/2024 5/31/2024     Left Right   Elbow Ext/Flex       Supination/Pronation       Wrist Ext/Flex 60 / 62 54 / 49   Wrist RD/UD          Hand ROM. Measured in degrees.    5/31/2024 5/31/2024     Left Right           Index: MP  Able to make full composite fist  29 / 35              PIP       58              DIP   25              PATRICIO               Long:  MP   33 / 45              PIP   44              DIP   28              PATRICIO               Ring:   MP   27 / 33              PIP   67              DIP   38              PATRICIO               Small:  MP   29               PIP   62               DIP   28              PATRICIO               Thumb: MP                    IP           Rad ADD/ABD           Pal ADD/ABD              Opposition              Strength (Dynamometer) and Pinch Strength (Pinch Gauge)  Measured in pounds.    5/31/2024 5/31/2024     Left Right   Rung II   deferred   Key Pinch       3pt Pinch       2pt Pinch          Sensation    5/27/2024 5/27/2024     Left Right   [place] Bessy       Normal 1.65-2.83       Diminished Light Touch 3.22-3.61       Diminished Protective 3.84-4.31       Loss of Protective 4.56-6.65       Untestable >6.65       2 Point Discrimination       Static       Dynamic          Manual Muscle Test    5/31/2024 5/31/2024     Left Right   Wrist Extension        Wrist Flexion       Radial Deviation       Ulnar Deviation       Supination       Pronation       Elbow Extension       Elbow Flexion             Limitation/Restriction for FOTO initial eval; Survey     Therapist reviewed FOTO scores for Michelle Mendoza on 5/27/2024.   FOTO documents entered into [employer] - see Media section.     Limitation Score: %       Treatment     Michelle received the treatments listed below:       Fluidotherapy: To R hand  for 10 min, continuous  air, 115 deg, air speed 50 to decrease pain, edema & scar tissue, sensory re- education, and increased tissue extensibility prior to therex    Therepeutic exercise: 25  - isospheres (2 min)   - digit extension (1 min)   - joint blocking x 10 reps each   - hook grasp x 10 reps (2 sets)   - wrist flex/ext x 10 reps (2 sets)       Manual therapy: 15 min   Retrograde massage   Thenar/hypothenar using tissue tool   Fascia gliding tissue tool LF volar/dorsal    Neuro re-ed: 5 min   - vibration ball palmar tissue (5 min)      Patient Education and Home Exercises     Education provided:   -   - Progress towards goals     Written Home Exercises Provided: Patient instructed to cont prior HEP.  Exercises were reviewed and Michelle was able to demonstrate them prior to the end of the session.  Michelle demonstrated good  understanding of the home exercise program provided. See electronic medical record under Patient Instructions for exercises provided during therapy sessions.       Assessment        Increased kandice with manual today however cont to be mod hypersensitive surrounding incision site. Pt is motivated to return to full function. Mod edema in LF limiting PIP and DIP     Michelle  progressing well towards her goals and there are no updates to goals at this time. Pt prognosis is .     Patient will continue to benefit from skilled outpatient occupational therapy to address the deficits listed in the problem list on initial evaluation provide patient/family education and to maximize patient's level of independence in the home and community environment.     Patient's spiritual, cultural and educational needs considered and patient agreeable to plan of care and goals.    Anticipated barriers to occupational therapy:     Goals:  Long term goals: (by d/c)  1)  Patient to be IND with HEP and modalities for pain management  2)  Increase ROM 5-10 degrees to increase functional hand use for ADLs/leisure activities  3)   Increase   strength 5-8 lbs. to carry laundry, carry groceries, sweep, and increase functional independence of right hand for ADLs/iADLs  4)   Increase pinch 3-4  psis for  Increase in functional independence in ADLs/iADLs such as manipulating fasteners and opening containers  5) Patient to score at %  or less on FOTO to demonstrate improved perception of functional rightUE Use.            Short term Goals: ( 4 weeks)   1)  Patient to be IND with HEP and modalities for pain management  2)  Increase ROM 3-5 degrees to increase functional hand use for ADLs/leisure activities  3)   Increase  strength 3-5 lbs. to carry laundry, carry groceries, sweep, and increase functional independence of right hand for ADLs/iADLs  4)   Increase pinch 1-3 psis for Increase in functional independence in ADLs/iADLs such as manipulating fasteners and opening containers  5)   Patient to score at %  or less on FOTO to demonstrate improved perception of functional right UE Use.      Plan     Updates/Grading for next session:     Dariela Katz OT   6/10/2024

## 2024-06-17 ENCOUNTER — CLINICAL SUPPORT (OUTPATIENT)
Dept: REHABILITATION | Facility: HOSPITAL | Age: 59
End: 2024-06-17
Payer: MEDICAID

## 2024-06-17 DIAGNOSIS — R60.0 LOCALIZED EDEMA: Primary | ICD-10-CM

## 2024-06-17 DIAGNOSIS — R52 PAIN: ICD-10-CM

## 2024-06-17 PROCEDURE — 97530 THERAPEUTIC ACTIVITIES: CPT

## 2024-06-17 NOTE — PROGRESS NOTES
[facility]  Occupational Therapy Treatment Note     Date: 6/17/2024  Name: Michelle Mendoza  Clinic Number: [id number]    Therapy Diagnosis:   Encounter Diagnoses   Name Primary?    Localized edema Yes    Pain            Physician: Phuc Bolaños PA    Physician Orders: Procedure Performed: R LF trigger finger releaseEval & treat right upper extremity.   Medical Diagnosis: Z98.890 (ICD-10-CM) - S/P trigger finger release   Surgical Procedure and Date: 5/2/2024, TF release    Evaluation Date: 5/27/2024  Insurance Authorization Period Expiration: 5/21/2025   Plan of Care Expiration: 8 weeks; 7/26/2024  Date of Return to MD: 7/25/2024  Visit # / Visits authorized: 1 / 1  FOTO: (date and score)     FOTO Lobby Code:         Precautions:  Standard        Time In:    04:00  PM  Time Out:       04:55     PM  Total Appointment Time (timed & untimed codes):  55     minutes            Subjective     : [id number]    Patient reports: right here hurts me (carpal tunnel) and tips of my fingers. I have been doing rice   She was compliant with home exercise program given last session.   Response to previous treatment: decreased pain  Functional change: able to put hair in a ponytail     Pain:    Location: right hands      Objective     Objective Measures updated at progress report unless specified.    Observation/Appearance: edema in digits, flexion in Mps      Edema. Measured in centimeters.    5/31/2024 5/31/2024     Left Right   2in. Above elbow       2in. Below elbow       Wrist Crease       Figure of 8       MCPs          Edema. Measured in centimeters.    5/31/2024 5/31/2024     Left Right   Index:         P1 5.5 cm 6.1 cm    PIP       P2        DIP       P3       Long:         P1 5.3 cm 6.5 cm    PIP       P2 4.7 cm 5.6 cm    DIP       P3       Ring:         P1            5.1 cm 6.0 cm    PIP                  P2                    DIP       P3       Small:          P1                    PIP              P2               DIP       P3       Thumb:       Prox. Phalanx       IP       Distal Phalanx          Elbow and Wrist ROM. Measured in degrees.    5/31/2024 5/31/2024     Left Right   Elbow Ext/Flex       Supination/Pronation       Wrist Ext/Flex 60 / 62 54 / 49   Wrist RD/UD          Hand ROM. Measured in degrees.    5/27/2024 5/27/2024 6/17/2024     Left Right Right            Index: MP  Able to make full composite fist  29 / 35 / 70              PIP       58 85              DIP   25 30              PATRICIO                 Long:  MP   33 / 45  29 / 75              PIP   44 5 / 64              DIP   28 35              PATRICIO                 Ring:   MP   27 / 33 / 82              PIP   67 80              DIP   38 41              PATRICIO                 Small:  MP   29 76               PIP   62 89               DIP   28 44              PATRICIO                 Thumb: MP                     IP            Rad ADD/ABD            Pal ADD/ABD               Opposition               Strength (Dynamometer) and Pinch Strength (Pinch Gauge)  Measured in pounds.    5/31/2024 5/31/2024     Left Right   Rung II   deferred   Key Pinch       3pt Pinch       2pt Pinch          Sensation    5/27/2024 5/27/2024     Left Right   [place] Bessy       Normal 1.65-2.83       Diminished Light Touch 3.22-3.61       Diminished Protective 3.84-4.31       Loss of Protective 4.56-6.65       Untestable >6.65       2 Point Discrimination       Static       Dynamic          Manual Muscle Test    5/31/2024 5/31/2024     Left Right   Wrist Extension        Wrist Flexion       Radial Deviation       Ulnar Deviation       Supination       Pronation       Elbow Extension       Elbow Flexion             Limitation/Restriction for FOTO initial eval; Survey     Therapist reviewed FOTO scores for Michelle Reji on 5/27/2024.   FOTO documents entered into [employer] - see Media section.     Limitation Score: %       Treatment     Michelle received the  treatments listed below:       Fluidotherapy: To R hand  for 10 min, continuous air, 115 deg, air speed 50 to decrease pain, edema & scar tissue, sensory re- education, and increased tissue extensibility prior to therex    Therepeutic exercise: 25  - isospheres (2 min)   - digit extension (1 min)   - joint blocking x 10 reps each   - hook grasp x 10 reps (2 sets)   - wrist flex/ext x 10 reps (2 sets)       Manual therapy: 15 min   Retrograde massage   Thenar/hypothenar using tissue tool   Fascia gliding tissue tool LF volar/dorsal    Neuro re-ed: 5 min   - vibration ball palmar tissue (5 min)      Patient Education and Home Exercises     Education provided:   -   - Progress towards goals     Written Home Exercises Provided: Patient instructed to cont prior HEP.  Exercises were reviewed and Michelle was able to demonstrate them prior to the end of the session.  Michelle demonstrated good  understanding of the home exercise program provided. See electronic medical record under Patient Instructions for exercises provided during therapy sessions.       Assessment        Increased kandice with manual today however cont to be mod hypersensitive surrounding incision site. Pt is motivated to return to full function. Mod edema in LF limiting PIP and DIP     Michelle  progressing well towards her goals and there are no updates to goals at this time. Pt prognosis is .     Patient will continue to benefit from skilled outpatient occupational therapy to address the deficits listed in the problem list on initial evaluation provide patient/family education and to maximize patient's level of independence in the home and community environment.     Patient's spiritual, cultural and educational needs considered and patient agreeable to plan of care and goals.    Anticipated barriers to occupational therapy:     Goals:  Long term goals: (by d/c)  1)  Patient to be IND with HEP and modalities for pain management  2)  Increase ROM 5-10 degrees to  increase functional hand use for ADLs/leisure activities  3)   Increase  strength 5-8 lbs. to carry laundry, carry groceries, sweep, and increase functional independence of right hand for ADLs/iADLs  4)   Increase pinch 3-4  psis for  Increase in functional independence in ADLs/iADLs such as manipulating fasteners and opening containers  5) Patient to score at %  or less on FOTO to demonstrate improved perception of functional rightUE Use.            Short term Goals: ( 4 weeks)   1)  Patient to be IND with HEP and modalities for pain management  2)  Increase ROM 3-5 degrees to increase functional hand use for ADLs/leisure activities  3)   Increase  strength 3-5 lbs. to carry laundry, carry groceries, sweep, and increase functional independence of right hand for ADLs/iADLs  4)   Increase pinch 1-3 psis for Increase in functional independence in ADLs/iADLs such as manipulating fasteners and opening containers  5)   Patient to score at %  or less on FOTO to demonstrate improved perception of functional right UE Use.      Plan     Updates/Grading for next session:     Dariela Katz OT   6/17/2024

## 2024-06-19 ENCOUNTER — CLINICAL SUPPORT (OUTPATIENT)
Dept: REHABILITATION | Facility: HOSPITAL | Age: 59
End: 2024-06-19
Payer: MEDICAID

## 2024-06-19 DIAGNOSIS — R52 PAIN: ICD-10-CM

## 2024-06-19 DIAGNOSIS — R60.0 LOCALIZED EDEMA: Primary | ICD-10-CM

## 2024-06-19 PROCEDURE — 97530 THERAPEUTIC ACTIVITIES: CPT

## 2024-06-19 NOTE — PATIENT INSTRUCTIONS
MEDIAN NERVE GLIDING      Complete 10 repeticiones de cada ejercicio de 4 a 6 veces al día.      1. Cierre el puño y mantenga la brie recta.  2. Estire los dedos y mantenga la brie recta.  3. Doble suavemente la brie hacia atrás y mantenga el pulgar cerca de los dedos.  4. Extiende el pulgar.  5. Gire el antebrazo de modo que la ruvalcaba quede hacia arriba.  6. Estire suavemente el pulgar con la otra mano.

## 2024-06-19 NOTE — PROGRESS NOTES
[facility]  Occupational Therapy Treatment Note     Date: 6/19/2024  Name: Michelle Mendoza  [facility] Number: [id number]    Therapy Diagnosis:   Encounter Diagnoses   Name Primary?    Localized edema Yes    Pain              Physician: Phuc Bolaños PA    Physician Orders: Procedure Performed: R LF trigger finger releaseEval & treat right upper extremity.   Medical Diagnosis: Z98.890 (ICD-10-CM) - S/P trigger finger release   Surgical Procedure and Date: 5/2/2024, TF release    Evaluation Date: 5/27/2024  Insurance Authorization Period Expiration: 5/21/2025   Plan of Care Expiration: 8 weeks; 7/26/2024  Date of Return to MD: 7/25/2024  Visit # / Visits authorized: 1 / 1  FOTO: (date and score)     FOTO Lobby Code:         Precautions:  Standard        Time In:    04:00  PM  Time Out:       04:55     PM  Total Appointment Time (timed & untimed codes):  55     minutes            Subjective     : [id number]      Patient reports: Still having a lot of pain   She was compliant with home exercise program given last session.   Response to previous treatment: decreased pain  Functional change: able to put hair in a ponytail     Pain:    Location: right hands      Objective     Objective Measures updated at progress report unless specified.    Observation/Appearance: edema in digits, flexion in Mps      Edema. Measured in centimeters.    5/31/2024 5/31/2024     Left Right   2in. Above elbow       2in. Below elbow       Wrist Crease       Figure of 8       MCPs          Edema. Measured in centimeters.    5/31/2024 5/31/2024     Left Right   Index:         P1 5.5 cm 6.1 cm    PIP       P2        DIP       P3       Long:         P1 5.3 cm 6.5 cm    PIP       P2 4.7 cm 5.6 cm    DIP       P3       Ring:         P1            5.1 cm 6.0 cm    PIP                  P2                    DIP       P3       Small:          P1                   PIP              P2               DIP       P3        Thumb:       Prox. Phalanx       IP       Distal Phalanx          Elbow and Wrist ROM. Measured in degrees.    5/31/2024 5/31/2024     Left Right   Elbow Ext/Flex       Supination/Pronation       Wrist Ext/Flex 60 / 62 54 / 49   Wrist RD/UD          Hand ROM. Measured in degrees.    5/27/2024 5/27/2024 6/17/2024     Left Right Right            Index: MP  Able to make full composite fist  29 / 35 / 70              PIP       58 85              DIP   25 30              PATRICIO                 Long:  MP   33 / 45  29 / 75              PIP   44 5 / 64              DIP   28 35              PATRICIO                 Ring:   MP   27 / 33 / 82              PIP   67 80              DIP   38 41              PATRICIO                 Small:  MP   29 76               PIP   62 89               DIP   28 44              PATRICIO                 Thumb: MP                     IP            Rad ADD/ABD            Pal ADD/ABD               Opposition               Strength (Dynamometer) and Pinch Strength (Pinch Gauge)  Measured in pounds.    5/31/2024 5/31/2024     Left Right   Rung II   deferred   Key Pinch       3pt Pinch       2pt Pinch          Sensation    5/27/2024 5/27/2024     Left Right   [place]       Normal 1.65-2.83       Diminished Light Touch 3.22-3.61       Diminished Protective 3.84-4.31       Loss of Protective 4.56-6.65       Untestable >6.65       2 Point Discrimination       Static       Dynamic          Manual Muscle Test    5/31/2024 5/31/2024     Left Right   Wrist Extension        Wrist Flexion       Radial Deviation       Ulnar Deviation       Supination       Pronation       Elbow Extension       Elbow Flexion             Limitation/Restriction for FOTO initial eval; Survey     Therapist reviewed FOTO scores for Michelle Mendoza on 5/27/2024.   FOTO documents entered into [employer] - see Media section.     Limitation Score: %       Treatment     Michelle received the treatments listed below:       Fluidotherapy: To R hand   for 10 min, continuous air, 115 deg, air speed 50 to decrease pain, edema & scar tissue, sensory re- education, and increased tissue extensibility prior to therex      Therepeutic exercise: 25  - isospheres (2 min)   - digit extension (1 min)   - joint blocking x 10 reps each   - hook grasp x 10 reps (2 sets)   - wrist flex/ext x 10 reps (2 sets)       Manual therapy: 15 min   Retrograde massage   Thenar/hypothenar using tissue tool   Fascia gliding tissue tool LF volar/dorsal    Neuro re-ed: 5 min   - vibration ball palmar tissue (5 min)      Patient Education and Home Exercises     Education provided:   -   - Progress towards goals     Written Home Exercises Provided: Patient instructed to cont prior HEP.  Exercises were reviewed and Michelle was able to demonstrate them prior to the end of the session.  Michelle demonstrated good  understanding of the home exercise program provided. See electronic medical record under Patient Instructions for exercises provided during therapy sessions.       Assessment        Increased kandice with manual today however cont to be mod hypersensitive surrounding incision site. Pt is motivated to return to full function. Mod edema in LF limiting PIP and DIP     Michelle  progressing well towards her goals and there are no updates to goals at this time. Pt prognosis is .     Patient will continue to benefit from skilled outpatient occupational therapy to address the deficits listed in the problem list on initial evaluation provide patient/family education and to maximize patient's level of independence in the home and community environment.     Patient's spiritual, cultural and educational needs considered and patient agreeable to plan of care and goals.    Anticipated barriers to occupational therapy:     Goals:  Long term goals: (by d/c)  1)  Patient to be IND with HEP and modalities for pain management  2)  Increase ROM 5-10 degrees to increase functional hand use for ADLs/leisure  activities  3)   Increase  strength 5-8 lbs. to carry laundry, carry groceries, sweep, and increase functional independence of right hand for ADLs/iADLs  4)   Increase pinch 3-4  psis for  Increase in functional independence in ADLs/iADLs such as manipulating fasteners and opening containers  5) Patient to score at %  or less on FOTO to demonstrate improved perception of functional rightUE Use.            Short term Goals: ( 4 weeks)   1)  Patient to be IND with HEP and modalities for pain management  2)  Increase ROM 3-5 degrees to increase functional hand use for ADLs/leisure activities  3)   Increase  strength 3-5 lbs. to carry laundry, carry groceries, sweep, and increase functional independence of right hand for ADLs/iADLs  4)   Increase pinch 1-3 psis for Increase in functional independence in ADLs/iADLs such as manipulating fasteners and opening containers  5)   Patient to score at %  or less on FOTO to demonstrate improved perception of functional right UE Use.      Plan     Updates/Grading for next session:     Dariela Katz OT   6/19/2024

## 2024-06-25 ENCOUNTER — CLINICAL SUPPORT (OUTPATIENT)
Dept: REHABILITATION | Facility: HOSPITAL | Age: 59
End: 2024-06-25
Payer: MEDICAID

## 2024-06-25 DIAGNOSIS — R60.0 LOCALIZED EDEMA: Primary | ICD-10-CM

## 2024-06-25 DIAGNOSIS — R52 PAIN: ICD-10-CM

## 2024-06-25 PROCEDURE — 97530 THERAPEUTIC ACTIVITIES: CPT | Mod: CO

## 2024-06-25 NOTE — PROGRESS NOTES
" [facility]  Occupational Therapy Treatment Note     Date: 6/25/2024  Name: Michelle Mendoza  Clinic Number: [id number]    Therapy Diagnosis:   Encounter Diagnoses   Name Primary?    Localized edema Yes    Pain                Physician: Phuc Bolaños PA    Physician Orders: Procedure Performed: R LF trigger finger releaseEval & treat right upper extremity.   Medical Diagnosis: Z98.890 (ICD-10-CM) - S/P trigger finger release   Surgical Procedure and Date: 5/2/2024, TF release    Evaluation Date: 5/27/2024  Insurance Authorization Period Expiration: 5/21/2025   Plan of Care Expiration: 8 weeks; 7/26/2024  Date of Return to MD: 7/25/2024  Visit # / Visits authorized: 6 / 15  FOTO: (date and score)     FOTO Lobby Code:         Precautions:  Standard        Time In:   7 AM  Time Out:   7:55  AM  Total Appointment Time (timed & untimed codes):  55  minutes            Subjective     : [id number]      Patient reports: she has starting having  "pins and needles" at night in her digits 2-4 and pain in her midline volar wrist   She was compliant with home exercise program given last session.   Response to previous treatment: decreased pain  Functional change: able to put hair in a ponytail     Pain:  7  Location: right hands      Objective     Objective Measures updated at progress report unless specified.    Observation/Appearance: edema in digits, flexion in Mps      Edema. Measured in centimeters.    5/31/2024 5/31/2024     Left Right   2in. Above elbow       2in. Below elbow       Wrist Crease       Figure of 8       MCPs          Edema. Measured in centimeters.    5/31/2024 5/31/2024     Left Right   Index:         P1 5.5 cm 6.1 cm    PIP       P2        DIP       P3       Long:         P1 5.3 cm 6.5 cm    PIP       P2 4.7 cm 5.6 cm    DIP       P3       Ring:         P1            5.1 cm 6.0 cm    PIP                  P2                    DIP       P3       Small:          P1     "               PIP              P2               DIP       P3       Thumb:       Prox. Phalanx       IP       Distal Phalanx          Elbow and Wrist ROM. Measured in degrees.    5/31/2024 5/31/2024     Left Right   Elbow Ext/Flex       Supination/Pronation       Wrist Ext/Flex 60 / 62 54 / 49   Wrist RD/UD          Hand ROM. Measured in degrees.    5/27/2024 5/27/2024 6/17/2024     Left Right Right            Index: MP  Able to make full composite fist  29 / 35 / 70              PIP       58 85              DIP   25 30              PATRICIO                 Long:  MP   33 / 45  29 / 75              PIP   44 5 / 64              DIP   28 35              PATRICIO                 Ring:   MP   27 / 33 / 82              PIP   67 80              DIP   38 41              PATRICIO                 Small:  MP   29 76               PIP   62 89               DIP   28 44              PATRICIO                 Thumb: MP                     IP            Rad ADD/ABD            Pal ADD/ABD               Opposition               Strength (Dynamometer) and Pinch Strength (Pinch Gauge)  Measured in pounds.    5/31/2024 5/31/2024     Left Right   Rung II   deferred   Key Pinch       3pt Pinch       2pt Pinch          Sensation    5/27/2024 5/27/2024     Left Right   [place] Bessy       Normal 1.65-2.83       Diminished Light Touch 3.22-3.61       Diminished Protective 3.84-4.31       Loss of Protective 4.56-6.65       Untestable >6.65       2 Point Discrimination       Static       Dynamic          Manual Muscle Test    5/31/2024 5/31/2024     Left Right   Wrist Extension        Wrist Flexion       Radial Deviation       Ulnar Deviation       Supination       Pronation       Elbow Extension       Elbow Flexion             Limitation/Restriction for FOTO initial eval; Survey     Therapist reviewed FOTO scores for Michelle Mendoza on 5/27/2024.   FOTO documents entered into [employer] - see Media section.     Limitation Score: %       Treatment     Michelle  received the treatments listed below:       Fluidotherapy: To R hand  for 10 min, continuous air, 115 deg, air speed 50 to decrease pain, edema & scar tissue, sensory re- education, and increased tissue extensibility prior to therex      Therepeutic exercise: 35 min   - isospheres (2 min)   - digit extension (1 min)   - joint blocking x 10 reps each   - hook grasp x 10 reps (2 sets)   - wrist flex/ext x 10 reps (2 sets)   - prayer stretch 15 sec holds x 3   - in hand manip with med poms         Manual therapy: 10 min   Retrograde massage   Thenar/hypothenar using tissue tool   Fascia gliding tissue tool LF volar/dorsal    Neuro re-ed: 0 min   - vibration ball palmar tissue (5 min)      Patient Education and Home Exercises     Education provided:   -   - Progress towards goals     Written Home Exercises Provided: Patient instructed to cont prior HEP.  Exercises were reviewed and Michelle was able to demonstrate them prior to the end of the session.  Michelle demonstrated good  understanding of the home exercise program provided. See electronic medical record under Patient Instructions for exercises provided during therapy sessions.       Assessment   Pt tolerated session well today despite continued hyper sensitivity in sx site. \  Client Care conference completed with evaluating therapist in regards to this patients POC as evidenced by co signature of supervising therapist.     Michelle  progressing well towards her goals and there are no updates to goals at this time. Pt prognosis is .     Patient will continue to benefit from skilled outpatient occupational therapy to address the deficits listed in the problem list on initial evaluation provide patient/family education and to maximize patient's level of independence in the home and community environment.     Patient's spiritual, cultural and educational needs considered and patient agreeable to plan of care and goals.    Anticipated barriers to occupational therapy:      Goals:  Long term goals: (by d/c)  1)  Patient to be IND with HEP and modalities for pain management  2)  Increase ROM 5-10 degrees to increase functional hand use for ADLs/leisure activities  3)   Increase  strength 5-8 lbs. to carry laundry, carry groceries, sweep, and increase functional independence of right hand for ADLs/iADLs  4)   Increase pinch 3-4  psis for  Increase in functional independence in ADLs/iADLs such as manipulating fasteners and opening containers  5) Patient to score at %  or less on FOTO to demonstrate improved perception of functional rightUE Use.            Short term Goals: ( 4 weeks)   1)  Patient to be IND with HEP and modalities for pain management  2)  Increase ROM 3-5 degrees to increase functional hand use for ADLs/leisure activities  3)   Increase  strength 3-5 lbs. to carry laundry, carry groceries, sweep, and increase functional independence of right hand for ADLs/iADLs  4)   Increase pinch 1-3 psis for Increase in functional independence in ADLs/iADLs such as manipulating fasteners and opening containers  5)   Patient to score at %  or less on FOTO to demonstrate improved perception of functional right UE Use.      Plan     Updates/Grading for next session:     ISADORA Mehta   6/25/2024

## 2024-06-27 ENCOUNTER — CLINICAL SUPPORT (OUTPATIENT)
Dept: REHABILITATION | Facility: HOSPITAL | Age: 59
End: 2024-06-27
Payer: MEDICAID

## 2024-06-27 DIAGNOSIS — R60.0 LOCALIZED EDEMA: Primary | ICD-10-CM

## 2024-06-27 DIAGNOSIS — R52 PAIN: ICD-10-CM

## 2024-06-27 PROCEDURE — 97110 THERAPEUTIC EXERCISES: CPT

## 2024-06-27 PROCEDURE — 97022 WHIRLPOOL THERAPY: CPT

## 2024-06-27 PROCEDURE — 97140 MANUAL THERAPY 1/> REGIONS: CPT

## 2024-06-27 NOTE — PROGRESS NOTES
[facility]  Occupational Therapy Treatment Note     Date: 6/27/2024  Name: Michelle Mendoza  Clinic Number: [id number]    Therapy Diagnosis:  right long finger trigger finger release                 Physician: Phuc Bolaños PA    Physician Orders: Procedure Performed: R LF trigger finger releaseEval & treat right upper extremity.   Medical Diagnosis: Z98.890 (ICD-10-CM) - S/P trigger finger release   Surgical Procedure and Date: 5/2/2024, TF release    Evaluation Date: 5/27/2024  Insurance Authorization Period Expiration: 5/21/2025   Plan of Care Expiration: 8 weeks; 7/26/2024  Date of Return to MD: 7/25/2024  Visit # / Visits authorized: 7/ 15  FOTO: (date and score)     FOTO Lobby Code:         Precautions:  Standard        Time In:   8:00 am   Time Out:  9:00 am   Total Appointment Time (timed & untimed codes):  55  minutes            Subjective     : [id number]      Patient reports:  she has started to wear a wrist splint in last 3 days , she thinks that the pain has started when the swelling went down in her hand .     She was compliant with home exercise program given last session.   Response to previous treatment: decreased pain  Functional change: able to put hair in a ponytail     Pain:  7  Location: right hands      Objective     Objective Measures updated at progress report unless specified.    Observation/Appearance: edema in digits, flexion in Mps      Edema. Measured in centimeters.    5/31/2024 5/31/2024     Left Right   2in. Above elbow       2in. Below elbow       Wrist Crease       Figure of 8       MCPs          Edema. Measured in centimeters.    5/31/2024 5/31/2024     Left Right   Index:         P1 5.5 cm 6.1 cm    PIP       P2        DIP       P3       Long:         P1 5.3 cm 6.5 cm    PIP       P2 4.7 cm 5.6 cm    DIP       P3       Ring:         P1            5.1 cm 6.0 cm    PIP                  P2                    DIP       P3       Small:           P1                   PIP              P2               DIP       P3       Thumb:       Prox. Phalanx       IP       Distal Phalanx          Elbow and Wrist ROM. Measured in degrees.    5/31/2024 5/31/2024     Left Right   Elbow Ext/Flex       Supination/Pronation       Wrist Ext/Flex 60 / 62 54 / 49   Wrist RD/UD          Hand ROM. Measured in degrees.    5/27/2024 5/27/2024 6/17/2024     Left Right Right            Index: MP  Able to make full composite fist  29 / 35 / 70              PIP       58 85              DIP   25 30              PATRICIO                 Long:  MP   33 / 45  29 / 75              PIP   44 5 / 64              DIP   28 35              PATRICIO                 Ring:   MP   27 / 33 / 82              PIP   67 80              DIP   38 41              PATRICIO                 Small:  MP   29 76               PIP   62 89               DIP   28 44              PATRICIO                 Thumb: MP                     IP            Rad ADD/ABD            Pal ADD/ABD               Opposition                 Strength (Dynamometer) and Pinch Strength (Pinch Gauge)  Measured in pounds.    5/31/2024 5/31/2024     Left Right   Rung II   deferred   Key Pinch       3pt Pinch       2pt Pinch              Sensation    5/27/2024 5/27/2024     Left Right   [place] Bessy       Normal 1.65-2.83       Diminished Light Touch 3.22-3.61       Diminished Protective 3.84-4.31       Loss of Protective 4.56-6.65       Untestable >6.65       2 Point Discrimination       Static       Dynamic              Manual Muscle Test    5/31/2024 5/31/2024     Left Right   Wrist Extension        Wrist Flexion       Radial Deviation       Ulnar Deviation       Supination       Pronation       Elbow Extension       Elbow Flexion             Limitation/Restriction for FOTO initial eval; Survey     Therapist reviewed FOTO scores for Michelle Mendoza on 5/27/2024.   FOTO documents entered into [employer] - see Media section.     Limitation Score: %       Treatment      Michelle received the treatments listed below:       Fluidotherapy: To R hand  for 10 min, continuous air, 115 deg, air speed 50 to decrease pain, edema & scar tissue, sensory re- education, and increased tissue extensibility prior to therex      Therepeutic exercise: 35 min   - isospheres (2 min)   - digit extension (1 min)   - joint blocking x 10 reps each   - hook grasp x 10 reps (2 sets) ( she has tight intrinsics all fingers   - wrist flex/ext x 10 reps (2 sets)   - prayer stretch 15 sec holds x 3      Fabricated a MP blocking exercise splint when she does the hook ex ,  instructed to wear it and do that exercise 2 times a day  20 reps    Discussed that we will see how this works and we may need to make her a RMO to improve PIP flexion and try K tape  for pain next visit.      Manual therapy: 10 min   Retrograde massage   Thenar/hypothenar using tissue tool   Fascia gliding tissue tool LF volar/dorsal    Neuro re-ed: 0 min   - vibration ball palmar tissue (5 min)      Patient Education and Home Exercises     Education provided:   -   - Progress towards goals     Written Home Exercises Provided: Patient instructed to cont prior HEP.  Exercises were reviewed and Michelle was able to demonstrate them prior to the end of the session.  Michelle demonstrated good  understanding of the home exercise program provided. See electronic medical record under Patient Instructions for exercises provided during therapy sessions.       Assessment   Pt tolerated session well today despite continued hyper sensitivity in sx site.   Through  she understands use of MP blocking ex splint and importance of PIP flexion ex .      Client Care conference completed with evaluating therapist in regards to this patients POC as evidenced by co signature of supervising therapist.     Michelle  progressing well towards her goals and there are no updates to goals at this time. Pt prognosis is .     Patient will continue to benefit from  skilled outpatient occupational therapy to address the deficits listed in the problem list on initial evaluation provide patient/family education and to maximize patient's level of independence in the home and community environment.     Patient's spiritual, cultural and educational needs considered and patient agreeable to plan of care and goals.    Anticipated barriers to occupational therapy:     Goals:  Long term goals: (by d/c)  1)  Patient to be IND with HEP and modalities for pain management  2)  Increase ROM 5-10 degrees to increase functional hand use for ADLs/leisure activities  3)   Increase  strength 5-8 lbs. to carry laundry, carry groceries, sweep, and increase functional independence of right hand for ADLs/iADLs  4)   Increase pinch 3-4  psis for  Increase in functional independence in ADLs/iADLs such as manipulating fasteners and opening containers  5) Patient to score at %  or less on FOTO to demonstrate improved perception of functional rightUE Use.            Short term Goals: ( 4 weeks)   1)  Patient to be IND with HEP and modalities for pain management  2)  Increase ROM 3-5 degrees to increase functional hand use for ADLs/leisure activities  3)   Increase  strength 3-5 lbs. to carry laundry, carry groceries, sweep, and increase functional independence of right hand for ADLs/iADLs  4)   Increase pinch 1-3 psis for Increase in functional independence in ADLs/iADLs such as manipulating fasteners and opening containers  5)   Patient to score at %  or less on FOTO to demonstrate improved perception of functional right UE Use.      Plan     Updates/Grading for next session:  ROM intrinsics and consider fabricating RMO  for pip flexion next visit     Catherine Garcia OT   6/27/2024

## 2024-07-03 ENCOUNTER — CLINICAL SUPPORT (OUTPATIENT)
Dept: REHABILITATION | Facility: HOSPITAL | Age: 59
End: 2024-07-03
Payer: MEDICAID

## 2024-07-03 DIAGNOSIS — R52 PAIN: ICD-10-CM

## 2024-07-03 DIAGNOSIS — R60.0 LOCALIZED EDEMA: Primary | ICD-10-CM

## 2024-07-03 PROCEDURE — 97530 THERAPEUTIC ACTIVITIES: CPT

## 2024-07-03 NOTE — PROGRESS NOTES
[facility]  Occupational Therapy Treatment Note     Date: 7/3/2024  Name: Michelle Mendoza  Clinic Number: [id number]    Therapy Diagnosis:  right long finger trigger finger release   Encounter Diagnoses   Name Primary?    Localized edema Yes    Pain          Physician: Phuc Bolaños PA    Physician Orders: Procedure Performed: R LF trigger finger releaseEval & treat right upper extremity.   Medical Diagnosis: Z98.890 (ICD-10-CM) - S/P trigger finger release   Surgical Procedure and Date: 5/2/2024, TF release    Evaluation Date: 5/27/2024  Insurance Authorization Period Expiration: 5/21/2025   Plan of Care Expiration: 8 weeks; 7/26/2024  Date of Return to MD: 7/25/2024  Visit # / Visits authorized: 7/ 15  FOTO: (date and score)     FOTO Lobby Code:         Precautions:  Standard        Time In:   05:00 pm   Time Out:  06:00 pm   Total Appointment Time (timed & untimed codes):  60  minutes            Subjective     : [id number]      Patient reports:  pain gets worse at nighttime     She was compliant with home exercise program given last session.   Response to previous treatment: decreased pain  Functional change: able to put hair in a ponytail     Pain:  6/10   Location: right hands      Objective     Objective Measures updated at progress report unless specified.    Observation/Appearance: edema in digits, flexion in Mps      Edema. Measured in centimeters.    5/31/2024 5/31/2024     Left Right   2in. Above elbow       2in. Below elbow       Wrist Crease       Figure of 8       MCPs          Edema. Measured in centimeters.    5/31/2024 5/31/2024     Left Right   Index:         P1 5.5 cm 6.1 cm    PIP       P2        DIP       P3       Long:         P1 5.3 cm 6.5 cm    PIP       P2 4.7 cm 5.6 cm    DIP       P3       Ring:         P1            5.1 cm 6.0 cm    PIP                  P2                    DIP       P3       Small:          P1                   PIP               P2               DIP       P3       Thumb:       Prox. Phalanx       IP       Distal Phalanx          Elbow and Wrist ROM. Measured in degrees.    5/31/2024 5/31/2024     Left Right   Elbow Ext/Flex       Supination/Pronation       Wrist Ext/Flex 60 / 62 54 / 49   Wrist RD/UD          Hand ROM. Measured in degrees.    5/27/2024 5/27/2024 6/17/2024     Left Right Right            Index: MP  Able to make full composite fist  29 / 35 / 70              PIP       58 85              DIP   25 30              PATRICIO                 Long:  MP   33 / 45  29 / 75              PIP   44 5 / 64              DIP   28 35              PATRICIO                 Ring:   MP   27 / 33 / 82              PIP   67 80              DIP   38 41              PATRICIO                 Small:  MP   29 76               PIP   62 89               DIP   28 44              PATRICIO                 Thumb: MP                     IP            Rad ADD/ABD            Pal ADD/ABD               Opposition                 Strength (Dynamometer) and Pinch Strength (Pinch Gauge)  Measured in pounds.    5/31/2024 5/31/2024     Left Right   Rung II   deferred   Key Pinch       3pt Pinch       2pt Pinch              Sensation    5/27/2024 5/27/2024     Left Right   [place] Bessy       Normal 1.65-2.83       Diminished Light Touch 3.22-3.61       Diminished Protective 3.84-4.31       Loss of Protective 4.56-6.65       Untestable >6.65       2 Point Discrimination       Static       Dynamic              Manual Muscle Test    5/31/2024 5/31/2024     Left Right   Wrist Extension        Wrist Flexion       Radial Deviation       Ulnar Deviation       Supination       Pronation       Elbow Extension       Elbow Flexion             Limitation/Restriction for FOTO initial eval; Survey     Therapist reviewed FOTO scores for Michelle Mendoza on 5/27/2024.   FOTO documents entered into [employer] - see Media section.     Limitation Score: %       Treatment     Michelle received the treatments  listed below:       Fluidotherapy: To R hand  for 10 min, continuous air, 115 deg, air speed 50 to decrease pain, edema & scar tissue, sensory re- education, and increased tissue extensibility prior to therex      Orthotic fit and train: 35 min   MP blocking wrist included to wear with HEP     Therepeutic exercise: 35 min NT  - isospheres (2 min)   - digit extension (1 min)   - joint blocking x 10 reps each   - hook grasp x 10 reps (2 sets) ( she has tight intrinsics all fingers   - wrist flex/ext x 10 reps (2 sets)   - prayer stretch 15 sec holds x 3      Fabricated a MP blocking exercise splint when she does the hook ex ,  instructed to wear it and do that exercise 2 times a day  20 reps    Discussed that we will see how this works and we may need to make her a RMO to improve PIP flexion and try K tape  for pain next visit.      Manual therapy: 10 min   Retrograde massage   Thenar/hypothenar using tissue tool   Fascia gliding tissue tool LF volar/dorsal    Neuro re-ed: 0 min   - vibration ball palmar tissue (5 min)      Patient Education and Home Exercises     Education provided:   -   - Progress towards goals     Written Home Exercises Provided: Patient instructed to cont prior HEP.  Exercises were reviewed and Michelle was able to demonstrate them prior to the end of the session.  Michelle demonstrated good  understanding of the home exercise program provided. See electronic medical record under Patient Instructions for exercises provided during therapy sessions.       Assessment     Good fit achieved with exercise orthosis.        Through  she understands use of MP blocking ex splint and importance of PIP flexion ex .        Michelle  progressing well towards her goals and there are no updates to goals at this time. Pt prognosis is .     Patient will continue to benefit from skilled outpatient occupational therapy to address the deficits listed in the problem list on initial evaluation provide  patient/family education and to maximize patient's level of independence in the home and community environment.     Patient's spiritual, cultural and educational needs considered and patient agreeable to plan of care and goals.    Anticipated barriers to occupational therapy:     Goals:  Long term goals: (by d/c)  1)  Patient to be IND with HEP and modalities for pain management  2)  Increase ROM 5-10 degrees to increase functional hand use for ADLs/leisure activities  3)   Increase  strength 5-8 lbs. to carry laundry, carry groceries, sweep, and increase functional independence of right hand for ADLs/iADLs  4)   Increase pinch 3-4  psis for  Increase in functional independence in ADLs/iADLs such as manipulating fasteners and opening containers  5) Patient to score at %  or less on FOTO to demonstrate improved perception of functional rightUE Use.            Short term Goals: ( 4 weeks)   1)  Patient to be IND with HEP and modalities for pain management  2)  Increase ROM 3-5 degrees to increase functional hand use for ADLs/leisure activities  3)   Increase  strength 3-5 lbs. to carry laundry, carry groceries, sweep, and increase functional independence of right hand for ADLs/iADLs  4)   Increase pinch 1-3 psis for Increase in functional independence in ADLs/iADLs such as manipulating fasteners and opening containers  5)   Patient to score at %  or less on FOTO to demonstrate improved perception of functional right UE Use.      Plan     Updates/Grading for next session:  ROM intrinsics and consider fabricating RMO  for pip flexion next visit     Dariela Katz, AMBREEN   7/3/2024

## 2024-07-10 ENCOUNTER — CLINICAL SUPPORT (OUTPATIENT)
Dept: REHABILITATION | Facility: HOSPITAL | Age: 59
End: 2024-07-10
Payer: MEDICAID

## 2024-07-10 DIAGNOSIS — R52 PAIN: ICD-10-CM

## 2024-07-10 DIAGNOSIS — R60.0 LOCALIZED EDEMA: Primary | ICD-10-CM

## 2024-07-10 PROCEDURE — 97530 THERAPEUTIC ACTIVITIES: CPT

## 2024-07-10 NOTE — PROGRESS NOTES
[facility]  Occupational Therapy Treatment Note     Date: 7/10/2024  Name: Michelle Mendoza  Clinic Number: [id number]    Therapy Diagnosis:  right long finger trigger finger release   Encounter Diagnoses   Name Primary?    Localized edema Yes    Pain            Physician: Phuc Bolaños PA    Physician Orders: Procedure Performed: R LF trigger finger releaseEval & treat right upper extremity.   Medical Diagnosis: Z98.890 (ICD-10-CM) - S/P trigger finger release   Surgical Procedure and Date: 5/2/2024, TF release    Evaluation Date: 5/27/2024  Insurance Authorization Period Expiration: 5/21/2025   Plan of Care Expiration: 8 weeks; 7/26/2024  Date of Return to MD: 7/25/2024  Visit # / Visits authorized: 7/ 15  FOTO: (date and score)     FOTO Lobby Code:         Precautions:  Standard        Time In:   05:00 pm   Time Out:  06:00 pm   Total Appointment Time (timed & untimed codes):  60  minutes            Subjective     : [id number]      Patient reports:  pain gets worse at nighttime, wakes up with hand completely numb. Still unable to get contact with doctor.     She was compliant with home exercise program given last session.   Response to previous treatment: decreased pain  Functional change: able to put hair in a ponytail     Pain:  6/10   Location: right hands      Objective     Objective Measures updated at progress report unless specified.    Observation/Appearance: edema in digits, flexion in Mps      Edema. Measured in centimeters.    5/31/2024 5/31/2024     Left Right   2in. Above elbow       2in. Below elbow       Wrist Crease       Figure of 8       MCPs          Edema. Measured in centimeters.    5/31/2024 5/31/2024     Left Right   Index:         P1 5.5 cm 6.1 cm    PIP       P2        DIP       P3       Long:         P1 5.3 cm 6.5 cm    PIP       P2 4.7 cm 5.6 cm    DIP       P3       Ring:         P1            5.1 cm 6.0 cm    PIP                  P2                     DIP       P3       Small:          P1                   PIP              P2               DIP       P3       Thumb:       Prox. Phalanx       IP       Distal Phalanx          Elbow and Wrist ROM. Measured in degrees.    5/31/2024 5/31/2024     Left Right   Elbow Ext/Flex       Supination/Pronation       Wrist Ext/Flex 60 / 62 54 / 49   Wrist RD/UD          Hand ROM. Measured in degrees.    5/27/2024 5/27/2024 6/17/2024     Left Right Right            Index: MP  Able to make full composite fist  29 / 35 / 70              PIP       58 85              DIP   25 30              PATRICIO                 Long:  MP   33 / 45  29 / 75              PIP   44 5 / 64              DIP   28 35              PATRICIO                 Ring:   MP   27 / 33 / 82              PIP   67 80              DIP   38 41              PATRICIO                 Small:  MP   29 76               PIP   62 89               DIP   28 44              PATRICIO                 Thumb: MP                     IP            Rad ADD/ABD            Pal ADD/ABD               Opposition                 Strength (Dynamometer) and Pinch Strength (Pinch Gauge)  Measured in pounds.    5/31/2024 5/31/2024     Left Right   Rung II   deferred   Key Pinch       3pt Pinch       2pt Pinch              Sensation    5/27/2024 5/27/2024     Left Right   [place] Bessy       Normal 1.65-2.83       Diminished Light Touch 3.22-3.61       Diminished Protective 3.84-4.31       Loss of Protective 4.56-6.65       Untestable >6.65       2 Point Discrimination       Static       Dynamic              Manual Muscle Test    5/31/2024 5/31/2024     Left Right   Wrist Extension        Wrist Flexion       Radial Deviation       Ulnar Deviation       Supination       Pronation       Elbow Extension       Elbow Flexion             Limitation/Restriction for FOTO initial eval; Survey     Therapist reviewed FOTO scores for Michelel Mendoza on 5/27/2024.   FOTO documents entered into [employer] - see Media section.      Limitation Score: %       Treatment     Michelle received the treatments listed below:       Fluidotherapy: To R hand  for 10 min, continuous air, 115 deg, air speed 50 to decrease pain, edema & scar tissue, sensory re- education, and increased tissue extensibility prior to therex      Orthotic fit and train: 10 min   RMO  MP blocking wrist included to wear with HEP NT      Therepeutic exercise: 15 min   - isospheres (2 min)   - digit extension (1 min)   - joint blocking x 10 reps each   - hook grasp x 10 reps (2 sets) ( she has tight intrinsics all fingers   - wrist flex/ext x 10 reps (2 sets)   - prayer stretch 15 sec holds x 3   - isometric hold with red flexbar x 10 reps (2 sets)      Fabricated a MP blocking exercise splint when she does the hook ex ,  instructed to wear it and do that exercise 2 times a day  20 reps    Discussed that we will see how this works and we may need to make her a RMO to improve PIP flexion and try K tape  for pain next visit.      Manual therapy: 15 min   Retrograde massage   Thenar/hypothenar using tissue tool   Fascia gliding tissue tool LF volar/dorsal      Neuro re-ed: 15 min   - vibration ball during manual       Patient Education and Home Exercises     Education provided:   -   - Progress towards goals     Written Home Exercises Provided: Patient instructed to cont prior HEP.  Exercises were reviewed and Michelle was able to demonstrate them prior to the end of the session.  Michelle demonstrated good  understanding of the home exercise program provided. See electronic medical record under Patient Instructions for exercises provided during therapy sessions.       Assessment     Cont to have mod sensitivity to volar MP surrounding incision. Numbness onset at nighttime. Has been wearing the brace at nighttime with no relief. Unable to get in touch with doctor to schedule f/u        Through  she understands use of MP blocking ex splint and importance of PIP flexion ex .         Michelle  progressing well towards her goals and there are no updates to goals at this time. Pt prognosis is .     Patient will continue to benefit from skilled outpatient occupational therapy to address the deficits listed in the problem list on initial evaluation provide patient/family education and to maximize patient's level of independence in the home and community environment.     Patient's spiritual, cultural and educational needs considered and patient agreeable to plan of care and goals.    Anticipated barriers to occupational therapy:     Goals:  Long term goals: (by d/c)  1)  Patient to be IND with HEP and modalities for pain management  2)  Increase ROM 5-10 degrees to increase functional hand use for ADLs/leisure activities  3)   Increase  strength 5-8 lbs. to carry laundry, carry groceries, sweep, and increase functional independence of right hand for ADLs/iADLs  4)   Increase pinch 3-4  psis for  Increase in functional independence in ADLs/iADLs such as manipulating fasteners and opening containers  5) Patient to score at %  or less on FOTO to demonstrate improved perception of functional rightUE Use.            Short term Goals: ( 4 weeks)   1)  Patient to be IND with HEP and modalities for pain management  2)  Increase ROM 3-5 degrees to increase functional hand use for ADLs/leisure activities  3)   Increase  strength 3-5 lbs. to carry laundry, carry groceries, sweep, and increase functional independence of right hand for ADLs/iADLs  4)   Increase pinch 1-3 psis for Increase in functional independence in ADLs/iADLs such as manipulating fasteners and opening containers  5)   Patient to score at %  or less on FOTO to demonstrate improved perception of functional right UE Use.      Plan     Updates/Grading for next session:  ROM intrinsics and consider fabricating RMO  for pip flexion next visit     Dariela Katz OT   7/10/2024

## 2024-07-15 ENCOUNTER — CLINICAL SUPPORT (OUTPATIENT)
Dept: REHABILITATION | Facility: HOSPITAL | Age: 59
End: 2024-07-15
Payer: MEDICAID

## 2024-07-15 DIAGNOSIS — R60.0 LOCALIZED EDEMA: Primary | ICD-10-CM

## 2024-07-15 DIAGNOSIS — R52 PAIN: ICD-10-CM

## 2024-07-15 PROCEDURE — 97530 THERAPEUTIC ACTIVITIES: CPT | Mod: CO

## 2024-07-15 NOTE — PROGRESS NOTES
[facility] OUTPATIENT THERAPY AND WELLNESS  Occupational Therapy Treatment Note     Date: 7/15/2024  Name: Michelle Mendoza  Clinic Number: [id number]    Therapy Diagnosis:  right long finger trigger finger release   Encounter Diagnoses   Name Primary?    Localized edema Yes    Pain            Physician: Phuc Bolaños PA    Physician Orders: Procedure Performed: R LF trigger finger releaseEval & treat right upper extremity.   Medical Diagnosis: Z98.890 (ICD-10-CM) - S/P trigger finger release   Surgical Procedure and Date: 5/2/2024, TF release    Evaluation Date: 5/27/2024  Insurance Authorization Period Expiration: 5/21/2025   Plan of Care Expiration: 8 weeks; 7/26/2024  Date of Return to MD: 7/25/2024  Visit # / Visits authorized: 10/ 15  FOTO: (date and score)     FOTO Lobby Code:         Precautions:  Standard        Time In:   4:59 pm   Time Out:  5:50 pm   Total Appointment Time (timed & untimed codes):  51  minutes            Subjective     : [id number]      Patient reports:  its ok     She was compliant with home exercise program given last session.   Response to previous treatment: decreased pain  Functional change: able to put hair in a ponytail     Pain:  6/10   Location: right hands      Objective     Objective Measures updated at progress report unless specified.    Observation/Appearance: edema in digits, flexion in Mps      Edema. Measured in centimeters.    5/31/2024 5/31/2024     Left Right   2in. Above elbow       2in. Below elbow       Wrist Crease       Figure of 8       MCPs          Edema. Measured in centimeters.    5/31/2024 5/31/2024     Left Right   Index:         P1 5.5 cm 6.1 cm    PIP       P2        DIP       P3       Long:         P1 5.3 cm 6.5 cm    PIP       P2 4.7 cm 5.6 cm    DIP       P3       Ring:         P1            5.1 cm 6.0 cm    PIP                  P2                    DIP       P3       Small:          P1                   PIP              P2               DIP        P3       Thumb:       Prox. Phalanx       IP       Distal Phalanx          Elbow and Wrist ROM. Measured in degrees.    5/31/2024 5/31/2024     Left Right   Elbow Ext/Flex       Supination/Pronation       Wrist Ext/Flex 60 / 62 54 / 49   Wrist RD/UD          Hand ROM. Measured in degrees.    5/27/2024 5/27/2024 6/17/2024     Left Right Right            Index: MP  Able to make full composite fist  29 / 35 / 70              PIP       58 85              DIP   25 30              PATRICIO                 Long:  MP   33 / 45  29 / 75              PIP   44 5 / 64              DIP   28 35              PATRICIO                 Ring:   MP   27 / 33 / 82              PIP   67 80              DIP   38 41              PATRICIO                 Small:  MP   29 76               PIP   62 89               DIP   28 44              PATRICIO                 Thumb: MP                     IP            Rad ADD/ABD            Pal ADD/ABD               Opposition                 Strength (Dynamometer) and Pinch Strength (Pinch Gauge)  Measured in pounds.    5/31/2024 5/31/2024     Left Right   Rung II   deferred   Key Pinch       3pt Pinch       2pt Pinch              Sensation    5/27/2024 5/27/2024     Left Right   [place] Bessy       Normal 1.65-2.83       Diminished Light Touch 3.22-3.61       Diminished Protective 3.84-4.31       Loss of Protective 4.56-6.65       Untestable >6.65       2 Point Discrimination       Static       Dynamic              Manual Muscle Test    5/31/2024 5/31/2024     Left Right   Wrist Extension        Wrist Flexion       Radial Deviation       Ulnar Deviation       Supination       Pronation       Elbow Extension       Elbow Flexion             Limitation/Restriction for FOTO initial eval; Survey     Therapist reviewed FOTO scores for Michelle Mendoza on 5/27/2024.   FOTO documents entered into [employer] - see Media section.     Limitation Score: %       Treatment     Michelle received the treatments listed below:        Fluidotherapy: To R hand  for 10 min, continuous air, 115 deg, air speed 50 to decrease pain, edema & scar tissue, sensory re- education, and increased tissue extensibility prior to therex      Orthotic fit and train: 0 min (NT)   MP blocking wrist included to wear with HEP     Therepeutic exercise: 31 min  - isospheres (2 min)   - digit extension (1 min)   - joint blocking x 10 reps each   - hook grasp x 10 reps (2 sets) ( she has tight intrinsics all fingers   - wrist flex/ext x 10 reps (2 sets)   - prayer stretch 15 sec holds x 3       Manual therapy: 10 min   Retrograde massage   Thenar/hypothenar using tissue tool   Fascia gliding tissue tool LF volar/dorsal    Neuro re-ed: 0 min   - vibration ball palmar tissue (5 min)      Patient Education and Home Exercises     Education provided:   -   - Progress towards goals     Written Home Exercises Provided: Patient instructed to cont prior HEP.  Exercises were reviewed and Michelle was able to demonstrate them prior to the end of the session.  Michelle demonstrated good  understanding of the home exercise program provided. See electronic medical record under Patient Instructions for exercises provided during therapy sessions.       Assessment     Good tolerance to tx today.        Michelle  progressing well towards her goals and there are no updates to goals at this time. Pt prognosis is .     Patient will continue to benefit from skilled outpatient occupational therapy to address the deficits listed in the problem list on initial evaluation provide patient/family education and to maximize patient's level of independence in the home and community environment.     Patient's spiritual, cultural and educational needs considered and patient agreeable to plan of care and goals.    Anticipated barriers to occupational therapy:     Goals:  Long term goals: (by d/c)  1)  Patient to be IND with HEP and modalities for pain management  2)  Increase ROM 5-10 degrees to increase  functional hand use for ADLs/leisure activities  3)   Increase  strength 5-8 lbs. to carry laundry, carry groceries, sweep, and increase functional independence of right hand for ADLs/iADLs  4)   Increase pinch 3-4  psis for  Increase in functional independence in ADLs/iADLs such as manipulating fasteners and opening containers  5) Patient to score at %  or less on FOTO to demonstrate improved perception of functional rightUE Use.            Short term Goals: ( 4 weeks)   1)  Patient to be IND with HEP and modalities for pain management  2)  Increase ROM 3-5 degrees to increase functional hand use for ADLs/leisure activities  3)   Increase  strength 3-5 lbs. to carry laundry, carry groceries, sweep, and increase functional independence of right hand for ADLs/iADLs  4)   Increase pinch 1-3 psis for Increase in functional independence in ADLs/iADLs such as manipulating fasteners and opening containers  5)   Patient to score at %  or less on FOTO to demonstrate improved perception of functional right UE Use.      Plan     Updates/Grading for next session: Update measures and POC     ISADORA Mehta   7/15/2024

## 2024-07-24 ENCOUNTER — CLINICAL SUPPORT (OUTPATIENT)
Dept: REHABILITATION | Facility: HOSPITAL | Age: 59
End: 2024-07-24
Payer: MEDICAID

## 2024-07-24 DIAGNOSIS — R52 PAIN: ICD-10-CM

## 2024-07-24 DIAGNOSIS — R60.0 LOCALIZED EDEMA: Primary | ICD-10-CM

## 2024-07-24 PROCEDURE — 97530 THERAPEUTIC ACTIVITIES: CPT

## 2024-07-24 NOTE — PROGRESS NOTES
"    [facility]  Occupational TherapyUpdated POC      Date: 7/24/2024  Name: Michelle Mendoza  Clinic Number: [id number]    Therapy Diagnosis:  right long finger trigger finger release   Encounter Diagnoses   Name Primary?    Localized edema Yes    Pain              Physician: Phuc Bolaños PA    Physician Orders: Procedure Performed: R LF trigger finger releaseEval & treat right upper extremity.   Medical Diagnosis: Z98.890 (ICD-10-CM) - S/P trigger finger release   Surgical Procedure and Date: 5/2/2024, TF release    Evaluation Date: 5/27/2024  Insurance Authorization Period Expiration: 5/21/2025   Plan of Care Expiration: 8 weeks; 7/26/2024  Date of Return to MD: 7/25/2024  Visit # / Visits authorized: 10/ 15  FOTO: (date and score)     FOTO [place] Code:         Precautions:  Standard        Time In:   4:59 pm   Time Out:  5:50 pm   Total Appointment Time (timed & untimed codes):  51  minutes            Subjective     : [id number]      Patient reports:  "I see the doctor tomorrow"     She was compliant with home exercise program given last session.   Response to previous treatment: decreased pain  Functional change: able to put hair in a ponytail     Pain:  7/10   Location: right hands      Objective     Objective Measures updated at progress report unless specified.    Observation/Appearance: edema in digits, flexion in Mps      Edema. Measured in centimeters.    5/31/2024 5/31/2024     Left Right   2in. Above elbow       2in. Below elbow       Wrist Crease       Figure of 8       MCPs          Edema. Measured in centimeters.    5/31/2024 5/31/2024 7/24/2024     Left Right Right   Index:          P1 5.5 cm 6.1 cm 5.5 cm     PIP        P2         DIP        P3        Long:          P1 5.3 cm 6.5 cm 5.6 cm     PIP        P2 4.7 cm 5.6 cm 5.6 cm     DIP        P3        Ring:          P1            5.1 cm 6.0 cm 4.8 cm     PIP                   P2                     DIP        P3 "        Small:           P1                    PIP               P2                DIP        P3        Thumb:        Prox. Phalanx        IP        Distal Phalanx           Elbow and Wrist ROM. Measured in degrees.    5/31/2024 5/31/2024 7/24/2024      Left Right Right   Elbow Ext/Flex        Supination/Pronation        Wrist Ext/Flex 60 / 62 54 / 49 56/49   Wrist RD/UD           Hand ROM. Measured in degrees.    5/27/2024 5/27/2024 6/17/2024 7/24/2024      Left Right Right Right             Index: MP  Able to make full composite fist  29 / 35 / 70 15/73              PIP       58 85 75              DIP   25 30 32              PATRICIO                   Long:  MP   33 / 45  29 / 75 30/80              PIP   44 5 / 64 10/67              DIP   28 35 38              PATRICIO                   Ring:   MP   27 / 33 / 82 25/82              PIP   67 80 81              DIP   38 41 35              PATRICIO                   Small:  MP   29 76 76               PIP   62 89 80               DIP   28 44 40              PATRICIO                   Thumb: MP                      IP             Rad ADD/ABD             Pal ADD/ABD                Opposition                  Strength (Dynamometer) and Pinch Strength (Pinch Gauge)  Measured in pounds.    7/24/2024 7/24/2024     Left Right   Rung II  34.5  12   Hood Pinch  9  6.5   3pt Pinch  5 2    2pt Pinch  4.5  2        Sensation    5/27/2024 5/27/2024     Left Right   [place] Bessy       Normal 1.65-2.83       Diminished Light Touch 3.22-3.61       Diminished Protective 3.84-4.31       Loss of Protective 4.56-6.65       Untestable >6.65       2 Point Discrimination       Static       Dynamic              Manual Muscle Test    5/31/2024 5/31/2024     Left Right   Wrist Extension        Wrist Flexion       Radial Deviation       Ulnar Deviation       Supination       Pronation       Elbow Extension       Elbow Flexion             Limitation/Restriction for FOTO initial eval; Survey     Therapist  reviewed FOTO scores for Michelle Mendoza on 5/27/2024.   FOTO documents entered into [employer] - see Media section.     Limitation Score: %       Treatment     Michelle received the treatments listed below:       Fluidotherapy: To R hand  for 10 min, continuous air, 115 deg, air speed 50 to decrease pain, edema & scar tissue, sensory re- education, and increased tissue extensibility prior to therex      Orthotic fit and train: 0 min (NT)   MP blocking wrist included to wear with HEP     Therepeutic exercise: 30 min  Updated objective measurement   - isospheres (2 min)   - digit extension (1 min)   - joint blocking x 10 reps each   - hook grasp x 10 reps (2 sets) ( she has tight intrinsics all fingers   - wrist flex/ext x 10 reps (2 sets)   - prayer stretch 15 sec holds x 3       Manual therapy: 10 min   Retrograde massage   Thenar/hypothenar using tissue tool   Fascia gliding tissue tool LF volar/dorsal    Neuro re-ed: 0 min   - vibration ball palmar tissue (5 min)      Patient Education and Home Exercises     Education provided:   -   - Progress towards goals     Written Home Exercises Provided: Patient instructed to cont prior HEP.  Exercises were reviewed and Michelle was able to demonstrate them prior to the end of the session.  Michelle demonstrated good  understanding of the home exercise program provided. See electronic medical record under Patient Instructions for exercises provided during therapy sessions.       Assessment     Good tolerance to tx today.        Michelle  progressing well towards her goals and there are no updates to goals at this time. Pt prognosis is .     Patient will continue to benefit from skilled outpatient occupational therapy to address the deficits listed in the problem list on initial evaluation provide patient/family education and to maximize patient's level of independence in the home and community environment.     Patient's spiritual, cultural and educational needs considered and patient  agreeable to plan of care and goals.    Anticipated barriers to occupational therapy:     Goals:  Long term goals: (by d/c)  1)  Patient to be IND with HEP and modalities for pain management  2)  Increase ROM 5-10 degrees to increase functional hand use for ADLs/leisure activities  3)   Increase  strength 5-8 lbs. to carry laundry, carry groceries, sweep, and increase functional independence of right hand for ADLs/iADLs  4)   Increase pinch 3-4  psis for  Increase in functional independence in ADLs/iADLs such as manipulating fasteners and opening containers  5) Patient to score at %  or less on FOTO to demonstrate improved perception of functional rightUE Use.            Short term Goals: ( 4 weeks)   1)  Patient to be IND with HEP and modalities for pain management  2)  Increase ROM 3-5 degrees to increase functional hand use for ADLs/leisure activities  3)   Increase  strength 3-5 lbs. to carry laundry, carry groceries, sweep, and increase functional independence of right hand for ADLs/iADLs  4)   Increase pinch 1-3 psis for Increase in functional independence in ADLs/iADLs such as manipulating fasteners and opening containers  5)   Patient to score at %  or less on FOTO to demonstrate improved perception of functional right UE Use.      Plan             PLAN     Updated Certification Period: 7/25/2024 to 8/30/2024   Recommended Treatment Plan: 1-2 times per week for 4-5 weeks:  Fluidotherapy, Manual Therapy, Moist Heat/ Ice, Neuromuscular Re-ed, Orthotic Management and Training, Paraffin, Patient Education, Self Care, Therapeutic Activities, Therapeutic Exercise, and Ultrasound  Other Recommendations:     Dariela Katz, OT

## 2024-08-12 ENCOUNTER — CLINICAL SUPPORT (OUTPATIENT)
Dept: REHABILITATION | Facility: HOSPITAL | Age: 59
End: 2024-08-12
Payer: MEDICAID

## 2024-08-12 DIAGNOSIS — R52 PAIN: ICD-10-CM

## 2024-08-12 DIAGNOSIS — R60.0 LOCALIZED EDEMA: Primary | ICD-10-CM

## 2024-08-12 PROCEDURE — 97022 WHIRLPOOL THERAPY: CPT

## 2024-08-12 PROCEDURE — 97110 THERAPEUTIC EXERCISES: CPT

## 2024-08-12 PROCEDURE — 97140 MANUAL THERAPY 1/> REGIONS: CPT

## 2024-08-12 NOTE — PROGRESS NOTES
"      [facility]  Occupational Therapy treatment Note      Date: 8/12/2024  Name: Michelle Mendoza  Clinic Number: [id number]    Therapy Diagnosis:  right long finger trigger finger release   Encounter Diagnoses   Name Primary?    Localized edema Yes    Pain                Physician: Phuc Bolaños PA    Physician Orders: Procedure Performed: R LF trigger finger releaseEval & treat right upper extremity.   Medical Diagnosis: Z98.890 (ICD-10-CM) - S/P trigger finger release   Surgical Procedure and Date: 5/2/2024, TF release    Evaluation Date: 5/27/2024  Insurance Authorization Period Expiration: 5/21/2025   Plan of Care Expiration: 8 weeks; 7/26/2024  Date of Return to MD: 7/25/2024  Visit # / Visits authorized: 10/ 15  FOTO: (date and score)     FOTO Lobby Code:         Precautions:  Standard        Time In:   4:59 pm   Time Out:  5:50 pm   Total Appointment Time (timed & untimed codes):  51  minutes            Subjective     : [id number]    Patient reports:  "not good. They told me it will take time. And that you will tell me if we continue with therapy. The swelling. Pt is concerned she has not seen the surgeon. She is still unable to make a fist. Reported during doctors visit increased pain. They recommended cont therapy and f/u with them after.        She was compliant with home exercise program given last session.   Response to previous treatment: decreased pain  Functional change: still unable to grasp items firmly         Pain:   /10   Location: right hands      Objective     Objective Measures updated at progress report unless specified.    Observation/Appearance: edema in digits, flexion in Mps      Edema. Measured in centimeters.    5/31/2024 5/31/2024     Left Right   2in. Above elbow       2in. Below elbow       Wrist Crease       Figure of 8       MCPs          Edema. Measured in centimeters.    5/31/2024 5/31/2024 7/24/2024     Left Right Right   Index:          P1 " 5.5 cm 6.1 cm 5.5 cm     PIP        P2         DIP        P3        Long:          P1 5.3 cm 6.5 cm 5.6 cm     PIP        P2 4.7 cm 5.6 cm 5.6 cm     DIP        P3        Ring:          P1            5.1 cm 6.0 cm 4.8 cm     PIP                   P2                     DIP        P3        Small:           P1                    PIP               P2                DIP        P3        Thumb:        Prox. Phalanx        IP        Distal Phalanx           Elbow and Wrist ROM. Measured in degrees.    5/31/2024 5/31/2024 7/24/2024      Left Right Right   Elbow Ext/Flex        Supination/Pronation        Wrist Ext/Flex 60 / 62 54 / 49 56/49   Wrist RD/UD           Hand ROM. Measured in degrees.    5/27/2024 5/27/2024 6/17/2024 7/24/2024 8/12/2024     Left Right Right Right Right              Index: MP  Able to make full composite fist  29 / 35 / 70 15/73 15/79              PIP       58 85 75 15/77              DIP   25 30 32 37              PATRICIO                     Long:  MP   33 / 45  29 / 75 30/80 18/78              PIP   44 5 / 64 10/67 15/68              DIP   28 35 38 36              PATRICIO                     Ring:   MP   27 / 33 / 82 25/82 77              PIP   67 80 81 15/84              DIP   38 41 35 35              PATRICIO                     Small:  MP   29 76 76                PIP   62 89 80                DIP   28 44 40               PATRICIO                     Thumb: MP                       IP              Rad ADD/ABD              Pal ADD/ABD                 Opposition                   Strength (Dynamometer) and Pinch Strength (Pinch Gauge)  Measured in pounds.    7/24/2024 7/24/2024 8/12/2024     Left Right Right   Rung II  34.5  12 30/23/15   Hood Pinch  9  6.5 6.5   3pt Pinch  5 2  3.5   2pt Pinch  4.5  2 2          Sensation    5/27/2024 5/27/2024     Left Right   [place] Bessy       Normal 1.65-2.83       Diminished Light Touch 3.22-3.61       Diminished Protective 3.84-4.31       Loss of Protective  4.56-6.65       Untestable >6.65       2 Point Discrimination       Static       Dynamic              Manual Muscle Test    5/31/2024 5/31/2024     Left Right   Wrist Extension        Wrist Flexion       Radial Deviation       Ulnar Deviation       Supination       Pronation       Elbow Extension       Elbow Flexion             Limitation/Restriction for FOTO initial eval; Survey     Therapist reviewed FOTO scores for Michelle Mendoza on 5/27/2024.   FOTO documents entered into [employer] - see Media section.     Limitation Score:    %       Treatment     Michelle received the treatments listed below:       Fluidotherapy: To R hand  for 10 min, continuous air, 115 deg, air speed 50 to decrease pain, edema & scar tissue, sensory re- education, and increased tissue extensibility prior to therex      Orthotic fit and train: 0 min (NT)   MP blocking wrist included to wear with HEP     Therepeutic exercise: 30 min  Updated objective measurement   - isospheres (2 min)   - digit extension (1 min)   - joint blocking x 10 reps each   - hook grasp x 10 reps (2 sets) ( she has tight intrinsics all fingers   - wrist flex/ext x 10 reps (2 sets)   - prayer stretch 15 sec holds x 3       Manual therapy: 10 min   Retrograde massage   Thenar/hypothenar using tissue tool   Fascia gliding tissue tool LF volar/dorsal    Neuro re-ed: 0 min   - vibration ball palmar tissue (5 min)      Patient Education and Home Exercises     Education provided:   -   - Progress towards goals     Written Home Exercises Provided: Patient instructed to cont prior HEP.  Exercises were reviewed and Michelle was able to demonstrate them prior to the end of the session.  Michelle demonstrated good  understanding of the home exercise program provided. See electronic medical record under Patient Instructions for exercises provided during therapy sessions.       Assessment     Good tolerance to tx today.        Michelle  progressing well towards her goals and there are no updates  to goals at this time. Pt prognosis is .     Patient will continue to benefit from skilled outpatient occupational therapy to address the deficits listed in the problem list on initial evaluation provide patient/family education and to maximize patient's level of independence in the home and community environment.     Patient's spiritual, cultural and educational needs considered and patient agreeable to plan of care and goals.    Anticipated barriers to occupational therapy:     Goals:  Long term goals: (by d/c)  1)  Patient to be IND with HEP and modalities for pain management  2)  Increase ROM 5-10 degrees to increase functional hand use for ADLs/leisure activities  3)   Increase  strength 5-8 lbs. to carry laundry, carry groceries, sweep, and increase functional independence of right hand for ADLs/iADLs  4)   Increase pinch 3-4  psis for  Increase in functional independence in ADLs/iADLs such as manipulating fasteners and opening containers  5) Patient to score at %  or less on FOTO to demonstrate improved perception of functional rightUE Use.            Short term Goals: ( 4 weeks)   1)  Patient to be IND with HEP and modalities for pain management  2)  Increase ROM 3-5 degrees to increase functional hand use for ADLs/leisure activities  3)   Increase  strength 3-5 lbs. to carry laundry, carry groceries, sweep, and increase functional independence of right hand for ADLs/iADLs  4)   Increase pinch 1-3 psis for Increase in functional independence in ADLs/iADLs such as manipulating fasteners and opening containers  5)   Patient to score at %  or less on FOTO to demonstrate improved perception of functional right UE Use.      Plan             PLAN     Updated Certification Period: 7/25/2024 to 8/30/2024   Recommended Treatment Plan: 1-2 times per week for 4-5 weeks:  Fluidotherapy, Manual Therapy, Moist Heat/ Ice, Neuromuscular Re-ed, Orthotic Management and Training, Paraffin, Patient Education, Self  Care, Therapeutic Activities, Therapeutic Exercise, and Ultrasound  Other Recommendations:     Dariela Katz, OT

## 2024-08-30 ENCOUNTER — CLINICAL SUPPORT (OUTPATIENT)
Dept: REHABILITATION | Facility: HOSPITAL | Age: 59
End: 2024-08-30
Payer: MEDICAID

## 2024-08-30 DIAGNOSIS — R52 PAIN: ICD-10-CM

## 2024-08-30 DIAGNOSIS — R60.0 LOCALIZED EDEMA: Primary | ICD-10-CM

## 2024-08-30 PROCEDURE — 97022 WHIRLPOOL THERAPY: CPT

## 2024-08-30 PROCEDURE — 97110 THERAPEUTIC EXERCISES: CPT

## 2024-08-30 NOTE — PATIENT INSTRUCTIONS
MEDIAN NERVE GLIDING    Complete 10 repetitions of each exercise 4-6 times a day.      1. Cierre el puño y mantenga la brie recta.  2. Estire los dedos y mantenga la brie recta.  3. Doble suavemente la brie hacia atrás y mantenga el pulgar cerca de los dedos.  4. Extiende el pulgar.  5. Gire el antebrazo de modo que la ruvalcaba quede hacia arriba.  6. Estire suavemente el pulgar con la otra mano.    Si alguno de estos ejercicios agrava tus danny, detente, descansa e intenta volver nuevamente al ejercicio anterior realizado sin dolor. Proceda a berry propio ritmo utilizando la tolerancia al dolor sivan guía.

## 2024-08-30 NOTE — PROGRESS NOTES
"        [facility]  Occupational TherapyUpdated POC      Date: 8/30/2024  Name: Michelle Mendoza  Clinic Number: [id number]    Therapy Diagnosis:  right long finger trigger finger release   Encounter Diagnoses   Name Primary?    Localized edema Yes    Pain          Physician: Phuc Bolaños PA    Physician Orders: Procedure Performed: R LF trigger finger releaseEval & treat right upper extremity.   Medical Diagnosis: Z98.890 (ICD-10-CM) - S/P trigger finger release   Surgical Procedure and Date: 5/2/2024, TF release    Evaluation Date: 5/27/2024  Insurance Authorization Period Expiration: 5/21/2025   Plan of Care Expiration: 8 weeks; 8/30/2024   Date of Return to MD: 7/25/2024  Visit # / Visits authorized: 13/ 15  FOTO: (date and score)     FOTO Lobby Code:         Precautions:  Standard        Time In:   09:15 am   Time Out:       am   Total Appointment Time (timed & untimed codes):    minutes            Subjective     : [id number]    Patient reports:    "Still can't do stuff during the day bc strength. Pain at night" no pain at rest     She was compliant with home exercise program given last session.   Response to previous treatment:   Functional change:         Pain:   7/10 not persistent pain. Pain with pressure   Location: right hands      Objective     Objective Measures updated at progress report unless specified.    Observation/Appearance: edema in digits, flexion in Mps      Edema. Measured in centimeters.    5/31/2024 5/31/2024     Left Right   2in. Above elbow       2in. Below elbow       Wrist Crease       Figure of 8       MCPs          Edema. Measured in centimeters.    5/31/2024 5/31/2024 7/24/2024     Left Right Right   Index:          P1 5.5 cm 6.1 cm 5.5 cm     PIP        P2         DIP        P3        Long:          P1 5.3 cm 6.5 cm 5.6 cm     PIP        P2 4.7 cm 5.6 cm 5.6 cm     DIP        P3        Ring:          P1            5.1 cm 6.0 cm 4.8 cm     PIP   "                 P2                     DIP        P3        Small:           P1                    PIP               P2                DIP        P3        Thumb:        Prox. Phalanx        IP        Distal Phalanx           Elbow and Wrist ROM. Measured in degrees.    5/31/2024 5/31/2024 7/24/2024      Left Right Right   Elbow Ext/Flex        Supination/Pronation        Wrist Ext/Flex 60 / 62 54 / 49 56/49   Wrist RD/UD           Hand ROM. Measured in degrees.    5/27/2024 5/27/2024 6/17/2024 7/24/2024 8/12/2024     Left Right Right Right Right              Index: MP  Able to make full composite fist  29 / 35 / 70 15/73 15/79              PIP       58 85 75 15/77              DIP   25 30 32 37              PATRICIO                     Long:  MP   33 / 45 29 / 75 30/80 18/78              PIP   44 5 / 64 10/67 15/68              DIP   28 35 38 36              PATRICIO                     Ring:   MP   27 / 33 / 82 25/82 77              PIP   67 80 81 15/84              DIP   38 41 35 35              PATRICIO                     Small:  MP   29 76 76                PIP   62 89 80                DIP   28 44 40               PATRICIO                     Thumb: MP                       IP              Rad ADD/ABD              Pal ADD/ABD                 Opposition                   Strength (Dynamometer) and Pinch Strength (Pinch Gauge)  Measured in pounds.    7/24/2024 7/24/2024 8/12/2024     Left Right Right   Rung II  34.5  12 30/23/15   Hood Pinch  9  6.5 6.5   3pt Pinch  5 2  3.5   2pt Pinch  4.5  2 2          Sensation    5/27/2024 5/27/2024     Left Right   [place] Bessy       Normal 1.65-2.83       Diminished Light Touch 3.22-3.61       Diminished Protective 3.84-4.31       Loss of Protective 4.56-6.65       Untestable >6.65       2 Point Discrimination       Static       Dynamic              Manual Muscle Test    5/31/2024 5/31/2024     Left Right   Wrist Extension        Wrist Flexion       Radial Deviation       Ulnar  Deviation       Supination       Pronation       Elbow Extension       Elbow Flexion             Limitation/Restriction for FOTO initial eval; Survey     Therapist reviewed FOTO scores for Michelle Mendoza on 5/27/2024.   FOTO documents entered into [employer] - see Media section.     Limitation Score:    %       Treatment     Michelle received the treatments listed below:       Fluidotherapy: To R hand  for 10 min, continuous air, 115 deg, air speed 50 to decrease pain, edema & scar tissue, sensory re- education, and increased tissue extensibility prior to therex      Orthotic fit and train: 0 min (NT)   MP blocking wrist included to wear with HEP     Therepeutic exercise: 30 min  Updated objective measurement   - isospheres (2 min)   - digit extension (1 min)   - joint blocking x 10 reps each   - hook grasp x 10 reps (2 sets) ( she has tight intrinsics all fingers   - wrist flex/ext x 10 reps (2 sets)   - prayer stretch 15 sec holds x 3       Manual therapy: 10 min   Retrograde massage   Thenar/hypothenar using tissue tool   Fascia gliding tissue tool LF volar/dorsal    Neuro re-ed: 0 min   - vibration ball palmar tissue (5 min)      Patient Education and Home Exercises     Education provided:   -   - Progress towards goals     Written Home Exercises Provided: Patient instructed to cont prior HEP.  Exercises were reviewed and Michelle was able to demonstrate them prior to the end of the session.  Michelle demonstrated good  understanding of the home exercise program provided. See electronic medical record under Patient Instructions for exercises provided during therapy sessions.       Assessment     Good tolerance to tx today.        Michelle  progressing well towards her goals and there are no updates to goals at this time. Pt prognosis is .     Patient will continue to benefit from skilled outpatient occupational therapy to address the deficits listed in the problem list on initial evaluation provide patient/family education and  to maximize patient's level of independence in the home and community environment.     Patient's spiritual, cultural and educational needs considered and patient agreeable to plan of care and goals.    Anticipated barriers to occupational therapy:     Goals:  Long term goals: (by d/c)  1)  Patient to be IND with HEP and modalities for pain management  2)  Increase ROM 5-10 degrees to increase functional hand use for ADLs/leisure activities  3)   Increase  strength 5-8 lbs. to carry laundry, carry groceries, sweep, and increase functional independence of right hand for ADLs/iADLs  4)   Increase pinch 3-4  psis for  Increase in functional independence in ADLs/iADLs such as manipulating fasteners and opening containers  5) Patient to score at %  or less on FOTO to demonstrate improved perception of functional rightUE Use.            Short term Goals: ( 4 weeks)   1)  Patient to be IND with HEP and modalities for pain management  2)  Increase ROM 3-5 degrees to increase functional hand use for ADLs/leisure activities  3)   Increase  strength 3-5 lbs. to carry laundry, carry groceries, sweep, and increase functional independence of right hand for ADLs/iADLs  4)   Increase pinch 1-3 psis for Increase in functional independence in ADLs/iADLs such as manipulating fasteners and opening containers  5)   Patient to score at %  or less on FOTO to demonstrate improved perception of functional right UE Use.      Plan             PLAN     Updated Certification Period: 7/25/2024 to 8/30/2024   Recommended Treatment Plan: 1-2 times per week for 4-5 weeks:  Fluidotherapy, Manual Therapy, Moist Heat/ Ice, Neuromuscular Re-ed, Orthotic Management and Training, Paraffin, Patient Education, Self Care, Therapeutic Activities, Therapeutic Exercise, and Ultrasound  Other Recommendations:     Dariela Katz, OT

## 2024-09-16 ENCOUNTER — CLINICAL SUPPORT (OUTPATIENT)
Dept: REHABILITATION | Facility: HOSPITAL | Age: 59
End: 2024-09-16
Payer: MEDICAID

## 2024-09-16 DIAGNOSIS — R60.0 LOCALIZED EDEMA: Primary | ICD-10-CM

## 2024-09-16 DIAGNOSIS — R52 PAIN: ICD-10-CM

## 2024-09-16 PROCEDURE — 97530 THERAPEUTIC ACTIVITIES: CPT

## 2024-09-16 NOTE — PROGRESS NOTES
[facility]  Occupational Therapy Discharge Summary     Date: 9/16/2024  Name: Michelle Mendoza  Clinic Number: [id number]    Therapy Diagnosis:  right long finger trigger finger release   Encounter Diagnoses   Name Primary?    Localized edema Yes    Pain            Physician: Phuc Bolaños PA    Physician Orders: Procedure Performed: R LF trigger finger releaseEval & treat right upper extremity.   Medical Diagnosis: Z98.890 (ICD-10-CM) - S/P trigger finger release   Surgical Procedure and Date: 5/2/2024, TF release    Evaluation Date: 5/27/2024  Insurance Authorization Period Expiration: 5/21/2025   Plan of Care Expiration: 8 weeks; 8/30/2024   Date of Return to MD: 7/25/2024  Visit # / Visits authorized: 14/ 15  FOTO: (date and score)     FOTO Lobby Code:         Precautions:  Standard        Time In:   05:05 pm   Time Out:    05:55   pm   Total Appointment Time (timed & untimed codes):  50   minutes            Subjective     : [id number]    Patient reports:  Cont to have pain and swelling. Unable to see dr. Mendez, Need referral from hand surgeon      She was compliant with home exercise program given last session.   Response to previous treatment:   Functional change:         Pain:   7/10 not persistent pain. Pain with pressure   Location: right hands      Objective     Objective Measures updated at progress report unless specified.    Observation/Appearance: edema in digits, flexion in Mps      Edema. Measured in centimeters.    5/31/2024 5/31/2024     Left Right   2in. Above elbow       2in. Below elbow       Wrist Crease       Figure of 8       MCPs          Edema. Measured in centimeters.    5/31/2024 5/31/2024 7/24/2024 9/16/2024     Left Right Right Right   Index:           P1 5.5 cm 6.1 cm 5.5 cm      PIP         P2          DIP         P3         Long:           P1 5.3 cm 6.5 cm 5.6 cm  5.6    PIP      5.6   P2 4.7 cm 5.6 cm 5.6 cm  4.7    DIP         P3         Ring:            P1            5.1 cm 6.0 cm 4.8 cm      PIP                    P2                      DIP         P3         Small:            P1                     PIP                P2                 DIP         P3         Thumb:         Prox. Phalanx         IP         Distal Phalanx            Elbow and Wrist ROM. Measured in degrees.    5/31/2024 5/31/2024 7/24/2024      Left Right Right   Elbow Ext/Flex        Supination/Pronation        Wrist Ext/Flex 60 / 62 54 / 49 56/49   Wrist RD/UD           Hand ROM. Measured in degrees.    5/27/2024 5/27/2024 6/17/2024 7/24/2024 8/12/2024 9/16/2024     Left Right Right Right Right Right               Index: MP  Able to make full composite fist  29 / 35 / 70 15/73 15/79 75              PIP       58 85 75 15/77 80              DIP   25 30 32 37 9/27              PATRICIO                       Long:  MP   33 / 45  29 / 75 30/80 18/78 6/79              PIP   44 5 / 64 10/67 15/68 10/65              DIP   28 35 38 36 35              PATRICIO                       Ring:   MP   27 / 33 / 82 25/82 77               PIP   67 80 81 15/84               DIP   38 41 35 35               PATRICIO                       Small:  MP   29 76 76                 PIP   62 89 80                 DIP   28 44 40                PATRICIO                       Thumb: MP                        IP               Rad ADD/ABD               Pal ADD/ABD                  Opposition                    Strength (Dynamometer) and Pinch Strength (Pinch Gauge)  Measured in pounds.    7/24/2024 7/24/2024 8/12/2024 9/16/2024     Left Right Right Right   Rung II  34.5  12 30/23/15 15/19/15  *stop due to pain    Hood Pinch  9  6.5 6.5 6   3pt Pinch  5 2  3.5 5   2pt Pinch  4.5  2 2 4          Sensation    5/27/2024 5/27/2024     Left Right   Roscoe Bessy       Normal 1.65-2.83       Diminished Light Touch 3.22-3.61       Diminished Protective 3.84-4.31       Loss of Protective 4.56-6.65       Untestable >6.65       2 Point  Discrimination       Static       Dynamic              Manual Muscle Test    5/31/2024 5/31/2024     Left Right   Wrist Extension        Wrist Flexion       Radial Deviation       Ulnar Deviation       Supination       Pronation       Elbow Extension       Elbow Flexion             Limitation/Restriction for FOTO initial eval; Survey     Therapist reviewed FOTO scores for Michelle Mendoza on 5/27/2024.   FOTO documents entered into [facility] - see Media section.     Limitation Score:    %       Treatment     Michelle received the treatments listed below:       Fluidotherapy: To R hand  for 10 min, continuous air, 115 deg, air speed 50 to decrease pain, edema & scar tissue, sensory re- education, and increased tissue extensibility prior to therex      Orthotic fit and train: 0 min (NT)   MP blocking wrist included to wear with HEP         Therepeutic exercise: 30 min  Updated objective measurement   Discussion about POC       Manual therapy: 10 min NT  Retrograde massage   Thenar/hypothenar using tissue tool   Fascia gliding tissue tool LF volar/dorsal    Neuro re-ed: 0 min   - vibration ball palmar tissue (5 min)      Patient Education and Home Exercises     Education provided:   -   - Progress towards goals     Written Home Exercises Provided: Patient instructed to cont prior HEP.  Exercises were reviewed and Michelle was able to demonstrate them prior to the end of the session.  Michelle demonstrated good  understanding of the home exercise program provided. See electronic medical record under Patient Instructions for exercises provided during therapy sessions.       Assessment   After discussion with pt, she will cont HEP at home.      Michelle  progressing well towards her goals and there are no updates to goals at this time. Pt prognosis is .     Patient will continue to benefit from skilled outpatient occupational therapy to address the deficits listed in the problem list on initial evaluation provide patient/family education  and to maximize patient's level of independence in the home and community environment.     Patient's spiritual, cultural and educational needs considered and patient agreeable to plan of care and goals.    Anticipated barriers to occupational therapy:     Goals:  Long term goals: (by d/c)  1)  Patient to be IND with HEP and modalities for pain management  2)  Increase ROM 5-10 degrees to increase functional hand use for ADLs/leisure activities  3)   Increase  strength 5-8 lbs. to carry laundry, carry groceries, sweep, and increase functional independence of right hand for ADLs/iADLs  4)   Increase pinch 3-4  psis for  Increase in functional independence in ADLs/iADLs such as manipulating fasteners and opening containers  5) Patient to score at %  or less on FOTO to demonstrate improved perception of functional rightUE Use.      Short term Goals: ( 4 weeks)   1)  Patient to be IND with HEP and modalities for pain management  2)  Increase ROM 3-5 degrees to increase functional hand use for ADLs/leisure activities  3)   Increase  strength 3-5 lbs. to carry laundry, carry groceries, sweep, and increase functional independence of right hand for ADLs/iADLs  4)   Increase pinch 1-3 psis for Increase in functional independence in ADLs/iADLs such as manipulating fasteners and opening containers  5)   Patient to score at %  or less on FOTO to demonstrate improved perception of functional right UE Use.      Plan             PLAN     Updated Certification Period: 7/25/2024 to 8/30/2024   Recommended Treatment Plan: 1-2 times per week for 4-5 weeks:  Fluidotherapy, Manual Therapy, Moist Heat/ Ice, Neuromuscular Re-ed, Orthotic Management and Training, Paraffin, Patient Education, Self Care, Therapeutic Activities, Therapeutic Exercise, and Ultrasound  Other Recommendations:     Dariela Katz, OT

## 2024-10-31 ENCOUNTER — CLINICAL SUPPORT (OUTPATIENT)
Dept: REHABILITATION | Facility: HOSPITAL | Age: 59
End: 2024-10-31
Payer: MEDICAID

## 2024-10-31 DIAGNOSIS — R52 PAIN: ICD-10-CM

## 2024-10-31 DIAGNOSIS — R60.0 LOCALIZED EDEMA: Primary | ICD-10-CM

## 2024-10-31 PROCEDURE — 97166 OT EVAL MOD COMPLEX 45 MIN: CPT

## 2024-10-31 PROCEDURE — 97530 THERAPEUTIC ACTIVITIES: CPT

## 2024-11-05 ENCOUNTER — DOCUMENTATION ONLY (OUTPATIENT)
Dept: REHABILITATION | Facility: HOSPITAL | Age: 59
End: 2024-11-05
Payer: MEDICAID

## 2024-11-11 NOTE — PROGRESS NOTES
Occupational Therapy Daily Treatment Note     Date: 11/12/2024  Name: Michelle Mendoza  Clinic Number: [id number]    Therapy Diagnosis:   Encounter Diagnoses   Name Primary?    Localized edema Yes    Pain      Physician: Phuc Bolaños PA    Medical Diagnosis: Right Hand Pain  Physician Orders:   Evaluation Date: 10/31/2024  Plan of Care Certification Date: 1/31/2025  Authorization Period: 10/18/2025  Surgery Date and Procedure: LF trigger release x 5 months  Date of Return to MD: JERICHO     Visit #: 1 of 12  Time In:  1:15 PM  Time Out: 2:00 PM  Total Billable Time: 45 min     Precautions: Standard,       Subjective     Pt reports: No change  Response to previous treatment:Mary well    Pain: 7/10  Location: Dorsum right hand    Objective     Michelle participated in dynamic functional therapeutic activities to improve functional performance for 45  minutes, including:  -Fluidotherapy  -AROM comp fists, lifts, spreads 20  -Isospheres 3'  -Towel scrunches 3'  -Grooved pegboard x 2  -Coins 3 containers  -VM to dorsal hand  -Soft tissue mobs volar FA    Home Exercises and Education Provided     Education provided:   - Progress towards goals     Written Home Exercises Provided: Patient instructed to cont prior HEP.  Exercises were reviewed and Michelle was able to demonstrate them prior to the end of the session.  Michelle demonstrated good  understanding of the HEP provided.   .   See EMR under Patient Instructions for exercises provided prior visit.        Assessment     Pt demonstrated decreased c/o pain following tx    Michelle is progressing towards her goals and there are no updates to goals at this time. Pt prognosis is Guarded.     Pt will continue to benefit from skilled outpatient occupational therapy to address the deficits listed in the problem list on initial evaluation provide pt/family education and to maximize pt's level of function.     Anticipated barriers to therapy: Length of time since Sx w/ little to no  improvement       Pt's spiritual, cultural and educational needs considered and pt agreeable to plan of care and goals.    Goals:  LTG's (8 weeks):  1)   Increase PATRICIO IF/LF 20 degrees in to increase functional hand use for ADL's. Progressing  2)   Increase  strength 10 lbs. to grasp pot handle. Progressing  3)   Increase 3J pinch 3 psis for opening bags. Progressing  4)   Decrease complaints of pain to  3 out of 10 at worst to increase functional hand use for ADL/work/leisure activities. Progressing  5)   Pt will return to near to prior level of function for ADLs and household management reporting I or Mod I with ADLs (dressing, feeding, grooming, toileting). Progressing     STG's (4 weeks)  1)   Patient to be IND with HEP and modalities for pain/edema managment. Progressing  2)   Increase PATRICIO IF/LF 10 degrees in to increase functional hand use for ADL's. Progressing  3)   Increase  strength 5 lbs. to grasp pot handle. Progressing  4)   Increase 3J pinch 1 psis for opening bags. Progressing  5)   Patient to be IND with Orthotic use, wear and care precautions. Progressing  6)   Decrease complaints of pain to  4 out of 10 at worst to increase functional hand use for ADL/work/leisure activities. Progressing    Plan   Cont OT to address above goals.        NICO Barros OTR/L, CHT

## 2024-11-12 ENCOUNTER — CLINICAL SUPPORT (OUTPATIENT)
Dept: REHABILITATION | Facility: HOSPITAL | Age: 59
End: 2024-11-12
Payer: MEDICAID

## 2024-11-12 DIAGNOSIS — R60.0 LOCALIZED EDEMA: Primary | ICD-10-CM

## 2024-11-12 DIAGNOSIS — R52 PAIN: ICD-10-CM

## 2024-11-12 PROCEDURE — 97530 THERAPEUTIC ACTIVITIES: CPT

## 2024-11-19 ENCOUNTER — CLINICAL SUPPORT (OUTPATIENT)
Dept: REHABILITATION | Facility: HOSPITAL | Age: 59
End: 2024-11-19
Payer: MEDICAID

## 2024-11-19 DIAGNOSIS — R60.0 LOCALIZED EDEMA: Primary | ICD-10-CM

## 2024-11-19 DIAGNOSIS — R52 PAIN: ICD-10-CM

## 2024-11-19 PROCEDURE — 97530 THERAPEUTIC ACTIVITIES: CPT | Mod: CO

## 2024-11-19 NOTE — PROGRESS NOTES
Occupational Therapy Daily Treatment Note     Date: 11/19/2024  Name: Michelle Mendoza  Clinic Number: [id number]    Therapy Diagnosis:   Encounter Diagnoses   Name Primary?    Localized edema Yes    Pain        Physician: Phuc Bolaños PA    Medical Diagnosis: Right Hand Pain  Physician Orders:   Evaluation Date: 10/31/2024  Plan of Care Certification Date: 1/31/2025  Authorization Period: 10/18/2025  Surgery Date and Procedure: LF trigger release x 5 months  Date of Return to MD: JERICHO     Visit #: 2 of 12  Time In:  2 PM  Time Out: 2:55 PM  Total Billable Time 55 min     Precautions: Standard,    # [id number]     Subjective     Pt reports: continues to have pain from her LF to her distal FA   Response to previous treatment:Mary well    Pain: 7/10  Location: Dorsum right hand    Objective     Michelle participated in dynamic functional therapeutic activities to improve functional performance for 55 minutes, including:  -Fluidotherapy  - ultrasound x 8 min to volar base of LF 3 MHz 1 w/cm2   PROM to LF   -AROM comp fists, lifts, spreads 20  -Isospheres 3'  -Towel scrunches 3'  -Grooved pegboard x 2  -Coins 3 containers  -Soft tissue mobs volar FA  - K tape distal LF to FA, space corrector at nail     Home Exercises and Education Provided     Education provided:   - Progress towards goals     Written Home Exercises Provided: Patient instructed to cont prior HEP.  Exercises were reviewed and Michelle was able to demonstrate them prior to the end of the session.  Michelle demonstrated good  understanding of the HEP provided.   .   See EMR under Patient Instructions for exercises provided prior visit.        Assessment     Pt demonstrated decreased c/o pain following tx. Continues to have intrinsic tightness.  Michelle is progressing towards her goals and there are no updates to goals at this time. Pt prognosis is Guarded.     Pt will continue to benefit from skilled outpatient occupational therapy to address the  deficits listed in the problem list on initial evaluation provide pt/family education and to maximize pt's level of function.     Anticipated barriers to therapy: Length of time since Sx w/ little to no improvement       Pt's spiritual, cultural and educational needs considered and pt agreeable to plan of care and goals.    Goals:  LTG's (8 weeks):  1)   Increase PATRICIO IF/LF 20 degrees in to increase functional hand use for ADL's. Progressing  2)   Increase  strength 10 lbs. to grasp pot handle. Progressing  3)   Increase 3J pinch 3 psis for opening bags. Progressing  4)   Decrease complaints of pain to  3 out of 10 at worst to increase functional hand use for ADL/work/leisure activities. Progressing  5)   Pt will return to near to prior level of function for ADLs and household management reporting I or Mod I with ADLs (dressing, feeding, grooming, toileting). Progressing     STG's (4 weeks)  1)   Patient to be IND with HEP and modalities for pain/edema managment. Progressing  2)   Increase PATRICIO IF/LF 10 degrees in to increase functional hand use for ADL's. Progressing  3)   Increase  strength 5 lbs. to grasp pot handle. Progressing  4)   Increase 3J pinch 1 psis for opening bags. Progressing  5)   Patient to be IND with Orthotic use, wear and care precautions. Progressing  6)   Decrease complaints of pain to  4 out of 10 at worst to increase functional hand use for ADL/work/leisure activities. Progressing    Plan   Cont OT to address above goals.    AYESHA Mehta/BRODY

## 2024-11-22 ENCOUNTER — CLINICAL SUPPORT (OUTPATIENT)
Dept: REHABILITATION | Facility: HOSPITAL | Age: 59
End: 2024-11-22
Payer: MEDICAID

## 2024-11-22 DIAGNOSIS — R60.0 LOCALIZED EDEMA: Primary | ICD-10-CM

## 2024-11-22 DIAGNOSIS — R52 PAIN: ICD-10-CM

## 2024-11-22 PROCEDURE — 97530 THERAPEUTIC ACTIVITIES: CPT | Mod: CO

## 2024-11-22 NOTE — PROGRESS NOTES
Occupational Therapy Daily Treatment Note     Date: 11/22/2024  Name: Michelle Mendoza  Clinic Number: [id number]    Therapy Diagnosis:   Encounter Diagnoses   Name Primary?    Localized edema Yes    Pain          Physician: Phuc Bolaños PA    Medical Diagnosis: Right Hand Pain  Physician Orders:   Evaluation Date: 10/31/2024  Plan of Care Certification Date: 1/31/2025  Authorization Period: 10/18/2025  Surgery Date and Procedure: LF trigger release x 5 months  Date of Return to MD: JERICHO     Visit #: 3 of 12  Time In:  1:27 PM  Time Out: 2:20 PM  Total Billable Time 53 min     Precautions: Standard,    # [id number]    Subjective     Pt reports: continues to have pain that shoots from her LF to her distal midline FA    Response to previous treatment:Mary well    Pain: 7/10  Location: Dorsum right hand    Objective     Michelle participated in dynamic functional therapeutic activities to improve functional performance for 53 minutes, including:  -Fluidotherapy  - ultrasound x 8 min to volar base of LF 3 MHz 1 w/cm2   PROM to LF   -AROM comp fists, lifts, spreads 20  -Isospheres 3'  -Towel scrunches 3'  -Grooved pegboard x 2  -Coins 3 containers  -Soft tissue mobs volar FA  - K tape to open carpal tunnel   - Medial nerve glides     Home Exercises and Education Provided     Education provided:   - Progress towards goals     Written Home Exercises Provided: Patient instructed to cont prior HEP.  Exercises were reviewed and Michelle was able to demonstrate them prior to the end of the session.  Michelle demonstrated good  understanding of the HEP provided.   .   See EMR under Patient Instructions for exercises provided prior visit.        Assessment     Pt continues to c/o pain that shoots from her LF down the middle of her hand and wrist. Also c/o numbness in her LF. Has been wearing braces at night time to sleep.     Michelle is progressing towards her goals and there are no updates to goals at this time. Pt prognosis  is Guarded.     Pt will continue to benefit from skilled outpatient occupational therapy to address the deficits listed in the problem list on initial evaluation provide pt/family education and to maximize pt's level of function.     Anticipated barriers to therapy: Length of time since Sx w/ little to no improvement       Pt's spiritual, cultural and educational needs considered and pt agreeable to plan of care and goals.    Goals:  LTG's (8 weeks):  1)   Increase PATRICIO IF/LF 20 degrees in to increase functional hand use for ADL's. Progressing  2)   Increase  strength 10 lbs. to grasp pot handle. Progressing  3)   Increase 3J pinch 3 psis for opening bags. Progressing  4)   Decrease complaints of pain to  3 out of 10 at worst to increase functional hand use for ADL/work/leisure activities. Progressing  5)   Pt will return to near to prior level of function for ADLs and household management reporting I or Mod I with ADLs (dressing, feeding, grooming, toileting). Progressing     STG's (4 weeks)  1)   Patient to be IND with HEP and modalities for pain/edema managment. Progressing  2)   Increase PATRICIO IF/LF 10 degrees in to increase functional hand use for ADL's. Progressing  3)   Increase  strength 5 lbs. to grasp pot handle. Progressing  4)   Increase 3J pinch 1 psis for opening bags. Progressing  5)   Patient to be IND with Orthotic use, wear and care precautions. Progressing  6)   Decrease complaints of pain to  4 out of 10 at worst to increase functional hand use for ADL/work/leisure activities. Progressing    Plan   Cont OT to address above goals.    AYESHA Mehta/BRODY

## 2024-11-22 NOTE — PATIENT INSTRUCTIONS
.  MEDIAN NERVE GLIDING    Complete 10 repetitions of each exercise 4-6 times a day.      Make a fist, keep the wrist straight.  Straighten the fingers, keep the wrist straight.  Gently bend the wrist back, keep the thumb close to the fingers.  Extend the thumb out.  Turn forearm so palm is facing up.  Gently stretch the thumb out using the other hand.    If any of these exercises aggravate your hands, stop, rest and try returning again to the previous exercise performed without pain. Proceed at your own pace using pain tolerance as your guide.

## 2024-11-25 NOTE — PROGRESS NOTES
Occupational Therapy Daily Treatment Note     Date: 11/26/2024  Name: Michelle Mendoza  Clinic Number: [id number]    Therapy Diagnosis:   Encounter Diagnoses   Name Primary?    Localized edema Yes    Pain        Physician: Phuc Bolaños PA    Medical Diagnosis: Right Hand Pain  Physician Orders:   Evaluation Date: 10/31/2024  Plan of Care Certification Date: 1/31/2025  Authorization Period: 10/18/2025  Surgery Date and Procedure: LF trigger release x 5 months  Date of Return to MD: JERCIHO     Visit #: 4 of 12  Time In:  1:00 PM  Time Out: 2:00 PM  Total Billable Time: 60 min     Precautions: Standard,       Subjective     Pt reports: No change  Response to previous treatment:Mary well    Pain: 7/10  Location: Dorsum right hand    Objective     Michelle participated in dynamic functional therapeutic activities to improve functional performance for 45  minutes, including:  -Fluidotherapy  -AROM comp fists, lifts, spreads 20  -Isospheres 3'  -Towel scrunches 3'  -Med pom poms yellow CP  -Rolling on yellow flexbar 3'  -Coins 3 containers  -VM to dorsal hand  -Soft tissue mobs volar FA    Home Exercises and Education Provided     Education provided:   - Progress towards goals     Written Home Exercises Provided: Patient instructed to cont prior HEP.  Exercises were reviewed and Michelle was able to demonstrate them prior to the end of the session.  Michelle demonstrated good  understanding of the HEP provided.   .   See EMR under Patient Instructions for exercises provided prior visit.        Assessment     Pt demonstrated decreased c/o pain following tx    Michelle is progressing towards her goals and there are no updates to goals at this time. Pt prognosis is Guarded.     Pt will continue to benefit from skilled outpatient occupational therapy to address the deficits listed in the problem list on initial evaluation provide pt/family education and to maximize pt's level of function.     Anticipated barriers to therapy: Length  of time since Sx w/ little to no improvement       Pt's spiritual, cultural and educational needs considered and pt agreeable to plan of care and goals.    Goals:  LTG's (8 weeks):  1)   Increase PATRICIO IF/LF 20 degrees in to increase functional hand use for ADL's. Progressing  2)   Increase  strength 10 lbs. to grasp pot handle. Progressing  3)   Increase 3J pinch 3 psis for opening bags. Progressing  4)   Decrease complaints of pain to  3 out of 10 at worst to increase functional hand use for ADL/work/leisure activities. Progressing  5)   Pt will return to near to prior level of function for ADLs and household management reporting I or Mod I with ADLs (dressing, feeding, grooming, toileting). Progressing     STG's (4 weeks)  1)   Patient to be IND with HEP and modalities for pain/edema managment. Progressing  2)   Increase PATRICIO IF/LF 10 degrees in to increase functional hand use for ADL's. Progressing  3)   Increase  strength 5 lbs. to grasp pot handle. Progressing  4)   Increase 3J pinch 1 psis for opening bags. Progressing  5)   Patient to be IND with Orthotic use, wear and care precautions. Progressing  6)   Decrease complaints of pain to  4 out of 10 at worst to increase functional hand use for ADL/work/leisure activities. Progressing    Plan   Cont OT to address above goals.        NICO Barros OTR/L, CHT

## 2024-11-26 ENCOUNTER — CLINICAL SUPPORT (OUTPATIENT)
Dept: REHABILITATION | Facility: HOSPITAL | Age: 59
End: 2024-11-26
Payer: MEDICAID

## 2024-11-26 DIAGNOSIS — R52 PAIN: ICD-10-CM

## 2024-11-26 DIAGNOSIS — R60.0 LOCALIZED EDEMA: Primary | ICD-10-CM

## 2024-11-26 PROCEDURE — 97530 THERAPEUTIC ACTIVITIES: CPT

## 2024-11-27 NOTE — PROGRESS NOTES
Occupational Therapy Daily Treatment Note     Date: 11/29/2024  Name: Michelle Mendoza  Clinic Number: [id number]    Therapy Diagnosis:   Encounter Diagnoses   Name Primary?    Localized edema Yes    Pain          Physician: Phuc Bolaños PA    Medical Diagnosis: Right Hand Pain  Physician Orders:   Evaluation Date: 10/31/2024  Plan of Care Certification Date: 1/31/2025  Authorization Period: 10/18/2025  Surgery Date and Procedure: LF trigger release x 5 months  Date of Return to MD: JERICHO     Visit #: 5 of 12  Time In:  1:00 PM  Time Out: 2:00 PM  Total Billable Time: 60 min     Precautions: Standard,       Subjective     Pt reports: Increased pain this AM  Response to previous treatment:Mary well    Pain: 7/10  Location: Dorsum right hand    Objective     Michelle participated in dynamic functional therapeutic activities to improve functional performance for 45  minutes, including:  -Fluidotherapy  -AROM comp fists, lifts, spreads 20  -Isospheres 3'  -Towel scrunches 3'  -Med pom poms yellow CP  -Rolling on green flexbar 3'  -Coins 3 container  -Yellow putty presses 2'  -VM to dorsal hand  -Rice bucket 4'    Home Exercises and Education Provided     Education provided:   - Progress towards goals     Written Home Exercises Provided: Patient instructed to cont prior HEP.  Exercises were reviewed and Michelle was able to demonstrate them prior to the end of the session.  Michelle demonstrated good  understanding of the HEP provided.   .   See EMR under Patient Instructions for exercises provided prior visit.        Assessment     Pt demonstrated no indication of progress so far.    Michelle is progressing towards her goals and there are no updates to goals at this time. Pt prognosis is Guarded.     Pt will continue to benefit from skilled outpatient occupational therapy to address the deficits listed in the problem list on initial evaluation provide pt/family education and to maximize pt's level of function.     Anticipated  barriers to therapy: Length of time since Sx w/ little to no improvement       Pt's spiritual, cultural and educational needs considered and pt agreeable to plan of care and goals.    Goals:  LTG's (8 weeks):  1)   Increase PATRICIO IF/LF 20 degrees in to increase functional hand use for ADL's. Progressing  2)   Increase  strength 10 lbs. to grasp pot handle. Progressing  3)   Increase 3J pinch 3 psis for opening bags. Progressing  4)   Decrease complaints of pain to  3 out of 10 at worst to increase functional hand use for ADL/work/leisure activities. Progressing  5)   Pt will return to near to prior level of function for ADLs and household management reporting I or Mod I with ADLs (dressing, feeding, grooming, toileting). Progressing     STG's (4 weeks)  1)   Patient to be IND with HEP and modalities for pain/edema managment. Progressing  2)   Increase PATRICIO IF/LF 10 degrees in to increase functional hand use for ADL's. Progressing  3)   Increase  strength 5 lbs. to grasp pot handle. Progressing  4)   Increase 3J pinch 1 psis for opening bags. Progressing  5)   Patient to be IND with Orthotic use, wear and care precautions. Progressing  6)   Decrease complaints of pain to  4 out of 10 at worst to increase functional hand use for ADL/work/leisure activities. Progressing    Plan   Cont OT to address above goals.        NICO Barros OTR/L, CHT

## 2024-11-29 ENCOUNTER — CLINICAL SUPPORT (OUTPATIENT)
Dept: REHABILITATION | Facility: HOSPITAL | Age: 59
End: 2024-11-29
Payer: MEDICAID

## 2024-11-29 DIAGNOSIS — R60.0 LOCALIZED EDEMA: Primary | ICD-10-CM

## 2024-11-29 DIAGNOSIS — R52 PAIN: ICD-10-CM

## 2024-11-29 PROCEDURE — 97530 THERAPEUTIC ACTIVITIES: CPT

## 2024-12-03 ENCOUNTER — CLINICAL SUPPORT (OUTPATIENT)
Dept: REHABILITATION | Facility: HOSPITAL | Age: 59
End: 2024-12-03
Payer: MEDICAID

## 2024-12-03 DIAGNOSIS — R60.0 LOCALIZED EDEMA: Primary | ICD-10-CM

## 2024-12-03 DIAGNOSIS — R52 PAIN: ICD-10-CM

## 2024-12-03 PROCEDURE — 97530 THERAPEUTIC ACTIVITIES: CPT

## 2024-12-03 NOTE — PROGRESS NOTES
Occupational Therapy Daily Treatment Note     Date: 12/3/2024  Name: Michelle Mendoza  Clinic Number: [id number]    Therapy Diagnosis:   Encounter Diagnoses   Name Primary?    Localized edema Yes    Pain            Physician: Phuc Bolaños PA    Medical Diagnosis: Right Hand Pain  Physician Orders:   Evaluation Date: 10/31/2024  Plan of Care Certification Date: 1/31/2025  Authorization Period: 10/18/2025  Surgery Date and Procedure: LF trigger release x 5 months  Date of Return to MD: JERICHO     Visit #: 6 of 12  Time In:  1:00 PM  Time Out: 2:00 PM  Total Billable Time: 60 min     Precautions: Standard,       Subjective     Pt reports: Increased pain today 2/2 cold weather  Response to previous treatment:Mary well    Pain: 7/10  Location: Dorsum right hand    Objective     Michelle participated in dynamic functional therapeutic activities to improve functional performance for 45  minutes, including:  -Fluidotherapy  -AROM comp fists, lifts, spreads 20  -Isospheres 3'  -Towel scrunches 3'  -Med pom poms yellow CP  -Rolling on green flexbar 3'  -Coins 3 container  -Yellow putty presses 2'  -VM to dorsal hand  -Rice bucket 4'      Range of Motion:   Right       Finger ROM    INDEX   LONG   RING   SMALL     MP  0/70 =/-15 0/78 =/-7 0/78 =/+3 0/75 =/=   PIP  0/81 =/-8 0/69 =/-11 0/88 =/-2 0/90 =/=   DIP  0/32 =/+2 0/35 =/-5 0/28 =/-18 0/35 =/-21   PATRICIO 183 -21 182 -23 194 -17 200 -21        Strength: (Measured in psi)     Rung II 20 -5         Pinch Strength (Measured in psi)     Key Pinch 9 -1   3pt Pinch 4 =        Home Exercises and Education Provided     Education provided:   - Progress towards goals     Written Home Exercises Provided: Patient instructed to cont prior HEP.  Exercises were reviewed and Michelle was able to demonstrate them prior to the end of the session.  Michelle demonstrated good  understanding of the HEP provided.   .   See EMR under Patient Instructions for exercises provided prior visit.         Assessment     Pt demonstrated decreased motion and increased pain today.    Michelle is progressing towards her goals and there are no updates to goals at this time. Pt prognosis is Guarded.     Pt will continue to benefit from skilled outpatient occupational therapy to address the deficits listed in the problem list on initial evaluation provide pt/family education and to maximize pt's level of function.     Anticipated barriers to therapy: Length of time since Sx w/ little to no improvement       Pt's spiritual, cultural and educational needs considered and pt agreeable to plan of care and goals.    Goals:  LTG's (8 weeks):  1)   Increase PATRICIO IF/LF 20 degrees in to increase functional hand use for ADL's. Progressing  2)   Increase  strength 10 lbs. to grasp pot handle. Progressing  3)   Increase 3J pinch 3 psis for opening bags. Progressing  4)   Decrease complaints of pain to  3 out of 10 at worst to increase functional hand use for ADL/work/leisure activities. Progressing  5)   Pt will return to near to prior level of function for ADLs and household management reporting I or Mod I with ADLs (dressing, feeding, grooming, toileting). Progressing     STG's (4 weeks)  1)   Patient to be IND with HEP and modalities for pain/edema managment. Progressing  2)   Increase PATRICIO IF/LF 10 degrees in to increase functional hand use for ADL's. Progressing  3)   Increase  strength 5 lbs. to grasp pot handle. Progressing  4)   Increase 3J pinch 1 psis for opening bags. Progressing  5)   Patient to be IND with Orthotic use, wear and care precautions. Progressing  6)   Decrease complaints of pain to  4 out of 10 at worst to increase functional hand use for ADL/work/leisure activities. Progressing    Plan   Cont OT to address above goals.        NICO Barros OTR/L, CHT

## 2024-12-05 NOTE — PROGRESS NOTES
Occupational Therapy Daily Treatment Note     Date: 12/6/2024  Name: Michelle Mendoza  Clinic Number: [id number]    Therapy Diagnosis:   Encounter Diagnoses   Name Primary?    Localized edema Yes    Pain              Physician: Phuc Bolaños PA    Medical Diagnosis: Right Hand Pain  Physician Orders:   Evaluation Date: 10/31/2024  Plan of Care Certification Date: 1/31/2025  Authorization Period: 10/18/2025  Surgery Date and Procedure: LF trigger release x 5 months  Date of Return to MD: JERICHO     Visit #: 7 of 12  Time In:  1:00 PM  Time Out: 2:00 PM  Total Billable Time: 60 min     Precautions: Standard,       Subjective     Pt reports: Increased pain today 2/2 cold weather  Response to previous treatment:Mray well    Pain: 7/10  Location: Dorsum right hand    Objective     Michelle participated in dynamic functional therapeutic activities to improve functional performance for 45  minutes, including:  -Fluidotherapy  -AROM comp fists, lifts, spreads 20  -Isospheres 3'  -Towel scrunches 3'  -Med pom poms yellow CP  -Rolling on green flexbar 3'  -Coins 3 container  -Yellow putty presses 2'  -VM to dorsal hand  -Rice bucket 4'    12/3/2024    Range of Motion:   Right       Finger ROM    INDEX   LONG   RING   SMALL     MP  0/70 =/-15 0/78 =/-7 0/78 =/+3 0/75 =/=   PIP  0/81 =/-8 0/69 =/-11 0/88 =/-2 0/90 =/=   DIP  0/32 =/+2 0/35 =/-5 0/28 =/-18 0/35 =/-21   PATRICIO 183 -21 182 -23 194 -17 200 -21        Strength: (Measured in psi)     Rung II 20 -5         Pinch Strength (Measured in psi)     Key Pinch 9 -1   3pt Pinch 4 =        Home Exercises and Education Provided     Education provided:   - Progress towards goals     Written Home Exercises Provided: Patient instructed to cont prior HEP.  Exercises were reviewed and Michelle was able to demonstrate them prior to the end of the session.  Michelle demonstrated good  understanding of the HEP provided.   .   See EMR under Patient Instructions for exercises provided prior  visit.        Assessment     Pt demonstrated no significant change today, still sensitive to cold.    Michelle is progressing towards her goals and there are no updates to goals at this time. Pt prognosis is Guarded.     Pt will continue to benefit from skilled outpatient occupational therapy to address the deficits listed in the problem list on initial evaluation provide pt/family education and to maximize pt's level of function.     Anticipated barriers to therapy: Length of time since Sx w/ little to no improvement       Pt's spiritual, cultural and educational needs considered and pt agreeable to plan of care and goals.    Goals:  LTG's (8 weeks):  1)   Increase PATRICIO IF/LF 20 degrees in to increase functional hand use for ADL's. Progressing  2)   Increase  strength 10 lbs. to grasp pot handle. Progressing  3)   Increase 3J pinch 3 psis for opening bags. Progressing  4)   Decrease complaints of pain to  3 out of 10 at worst to increase functional hand use for ADL/work/leisure activities. Progressing  5)   Pt will return to near to prior level of function for ADLs and household management reporting I or Mod I with ADLs (dressing, feeding, grooming, toileting). Progressing     STG's (4 weeks)  1)   Patient to be IND with HEP and modalities for pain/edema managment. Progressing  2)   Increase PATRICIO IF/LF 10 degrees in to increase functional hand use for ADL's. Progressing  3)   Increase  strength 5 lbs. to grasp pot handle. Progressing  4)   Increase 3J pinch 1 psis for opening bags. Progressing  5)   Patient to be IND with Orthotic use, wear and care precautions. Progressing  6)   Decrease complaints of pain to  4 out of 10 at worst to increase functional hand use for ADL/work/leisure activities. Progressing    Plan   Cont OT to address above goals.        NICO Barros OTR/L, CHT

## 2024-12-06 ENCOUNTER — CLINICAL SUPPORT (OUTPATIENT)
Dept: REHABILITATION | Facility: HOSPITAL | Age: 59
End: 2024-12-06
Payer: MEDICAID

## 2024-12-06 DIAGNOSIS — R60.0 LOCALIZED EDEMA: Primary | ICD-10-CM

## 2024-12-06 DIAGNOSIS — R52 PAIN: ICD-10-CM

## 2024-12-06 PROCEDURE — 97530 THERAPEUTIC ACTIVITIES: CPT

## 2024-12-09 NOTE — PROGRESS NOTES
Occupational Therapy Daily Treatment Note     Date: 12/10/2024  Name: Michelle Mendoza  Clinic Number: [id number]    Therapy Diagnosis:   Encounter Diagnoses   Name Primary?    Localized edema Yes    Pain                Physician: Phuc Bolaños PA    Medical Diagnosis: Right Hand Pain  Physician Orders:   Evaluation Date: 10/31/2024  Plan of Care Certification Date: 1/31/2025  Authorization Period: 10/18/2025  Surgery Date and Procedure: LF trigger release x 5 months  Date of Return to MD: JERICHO     Visit #: 8 of 12  Time In:  1:58 PM  Time Out: 1:48 PM  Total Billable Time: 50 min     Precautions: Standard,       Subjective     Pt reports: A little better, cleaned the house over the weekend and her hand felt 'Terrible'   Daughter present for translation today due to pt preference over service   Response to previous treatment:Mary well    Pain: 7/10  Location: Dorsum right hand    Objective     Michelle participated in dynamic functional therapeutic activities to improve functional performance for 50 minutes, including:  -Fluidotherapy  -AROM comp fists, lifts, spreads 20  -Isospheres 3'  -Towel scrunches 3'  -Med pom poms yellow CP (NT)   -Rolling on green flexbar 3'  -Coins 3 container (NT)   -Yellow putty presses 2' (NT)   -VM to dorsal hand  - tissue movers to palm  -Rice bucket 4' (NT)   - intrinsic strengthening spreads with yellow RB and add with blue sponges x 1 min   - intrinsic stretch x 5 - difficulty to tolerate       12/3/2024    Range of Motion:   Right       Finger ROM    INDEX   LONG   RING   SMALL     MP  0/70 =/-15 0/78 =/-7 0/78 =/+3 0/75 =/=   PIP  0/81 =/-8 0/69 =/-11 0/88 =/-2 0/90 =/=   DIP  0/32 =/+2 0/35 =/-5 0/28 =/-18 0/35 =/-21   PATRICIO 183 -21 182 -23 194 -17 200 -21        Strength: (Measured in psi)     Rung II 20 -5         Pinch Strength (Measured in psi)     Key Pinch 9 -1   3pt Pinch 4 =        Home Exercises and Education Provided     Education provided:   - Progress towards goals      Written Home Exercises Provided: Patient instructed to cont prior HEP.  Exercises were reviewed and Michelle was able to demonstrate them prior to the end of the session.  Michelle demonstrated good  understanding of the HEP provided.   .   See EMR under Patient Instructions for exercises provided prior visit.        Assessment     Continues with pain and sensitivity.    Michelle is progressing towards her goals and there are no updates to goals at this time. Pt prognosis is Guarded.     Pt will continue to benefit from skilled outpatient occupational therapy to address the deficits listed in the problem list on initial evaluation provide pt/family education and to maximize pt's level of function.     Anticipated barriers to therapy: Length of time since Sx w/ little to no improvement       Pt's spiritual, cultural and educational needs considered and pt agreeable to plan of care and goals.    Goals:  LTG's (8 weeks):  1)   Increase PATRICIO IF/LF 20 degrees in to increase functional hand use for ADL's. Progressing  2)   Increase  strength 10 lbs. to grasp pot handle. Progressing  3)   Increase 3J pinch 3 psis for opening bags. Progressing  4)   Decrease complaints of pain to  3 out of 10 at worst to increase functional hand use for ADL/work/leisure activities. Progressing  5)   Pt will return to near to prior level of function for ADLs and household management reporting I or Mod I with ADLs (dressing, feeding, grooming, toileting). Progressing     STG's (4 weeks)  1)   Patient to be IND with HEP and modalities for pain/edema managment. Progressing  2)   Increase PATRICIO IF/LF 10 degrees in to increase functional hand use for ADL's. Progressing  3)   Increase  strength 5 lbs. to grasp pot handle. Progressing  4)   Increase 3J pinch 1 psis for opening bags. Progressing  5)   Patient to be IND with Orthotic use, wear and care precautions. Progressing  6)   Decrease complaints of pain to  4 out of 10 at worst to  increase functional hand use for ADL/work/leisure activities. Progressing    Plan   Cont OT to address above goals.      AYESHA Mehta/BRODY

## 2024-12-10 ENCOUNTER — CLINICAL SUPPORT (OUTPATIENT)
Dept: REHABILITATION | Facility: HOSPITAL | Age: 59
End: 2024-12-10
Payer: MEDICAID

## 2024-12-10 DIAGNOSIS — R60.0 LOCALIZED EDEMA: Primary | ICD-10-CM

## 2024-12-10 DIAGNOSIS — R52 PAIN: ICD-10-CM

## 2024-12-10 PROCEDURE — 97530 THERAPEUTIC ACTIVITIES: CPT | Mod: CO

## 2024-12-12 NOTE — PROGRESS NOTES
Occupational Therapy Daily Treatment Note     Date: 12/13/2024  Name: Michelle Mendoza  Clinic Number: [id number]    Therapy Diagnosis:   Encounter Diagnoses   Name Primary?    Localized edema Yes    Pain                Physician: Phuc Bolaños PA    Medical Diagnosis: Right Hand Pain  Physician Orders:   Evaluation Date: 10/31/2024  Plan of Care Certification Date: 1/31/2025  Authorization Period: 10/18/2025  Surgery Date and Procedure: LF trigger release x 5 months  Date of Return to MD: JERICHO     Visit #: 9 of 12  Time In:  1:00 PM  Time Out: 2:00 PM  Total Billable Time: 60 min     Precautions: Standard,       Subjective     Pt reports: Overall pt feels she is a little better since restarting therapy  Response to previous treatment:Mary well    Pain: 7/10  Location: Dorsum right hand    Objective     Michelle participated in dynamic functional therapeutic activities to improve functional performance for 45  minutes, including:  -Fluidotherapy  -AROM comp fists, lifts, spreads 20  -Isospheres 3'  -Towel scrunches 3'  -Med pom poms yellow CP  -Rolling on green flexbar 3'  -Coins 3 container  -Yellow putty presses 2'  -Michelle  received a 4 hour extended wear Iontophoresis patch for pain control and decreased inflammation with 1.0cc Dexamethasone applied to right volar FA-hand. Patient educated on wear time and precautions voicing  understanding and compliance.    12/3/2024    Range of Motion:   Right     Finger ROM    INDEX   LONG   RING   SMALL     MP  0/78 =/-8 0/82 =/-3 0/70 =/-5 0/86 =/+11   PIP  0/78 =/-11 0/68 =/-12 0/92 =/+2 0/95 =/+5   DIP  0/45 =/+15 0/40 =/= 0/45 =/-1 0/45 =/-11   PATRICIO 201 -3 190 -23 201 -10 200 -21         Strength: (Measured in psi)     Rung II 20 =         Pinch Strength (Measured in psi)     Key Pinch 8 -1   3pt Pinch 4 =        Home Exercises and Education Provided     Education provided:   - Progress towards goals     Written Home Exercises Provided: Patient instructed to cont  prior HEP.  Exercises were reviewed and Michelle was able to demonstrate them prior to the end of the session.  Michelle demonstrated good  understanding of the HEP provided.   .   See EMR under Patient Instructions for exercises provided prior visit.        Assessment     Pt demonstrated no significant change today, still sensitive to cold.    Michelle is progressing towards her goals and there are no updates to goals at this time. Pt prognosis is Guarded.     Pt will continue to benefit from skilled outpatient occupational therapy to address the deficits listed in the problem list on initial evaluation provide pt/family education and to maximize pt's level of function.     Anticipated barriers to therapy: Length of time since Sx w/ little to no improvement       Pt's spiritual, cultural and educational needs considered and pt agreeable to plan of care and goals.    Goals:  LTG's (8 weeks):  1)   Increase PATRICIO IF/LF 20 degrees in to increase functional hand use for ADL's. Progressing  2)   Increase  strength 10 lbs. to grasp pot handle. Progressing  3)   Increase 3J pinch 3 psis for opening bags. Progressing  4)   Decrease complaints of pain to  3 out of 10 at worst to increase functional hand use for ADL/work/leisure activities. Progressing  5)   Pt will return to near to prior level of function for ADLs and household management reporting I or Mod I with ADLs (dressing, feeding, grooming, toileting). Progressing     STG's (4 weeks)  1)   Patient to be IND with HEP and modalities for pain/edema managment. Progressing  2)   Increase PATRICIO IF/LF 10 degrees in to increase functional hand use for ADL's. Progressing  3)   Increase  strength 5 lbs. to grasp pot handle. Progressing  4)   Increase 3J pinch 1 psis for opening bags. Progressing  5)   Patient to be IND with Orthotic use, wear and care precautions. Progressing  6)   Decrease complaints of pain to  4 out of 10 at worst to increase functional hand use for  ADL/work/leisure activities. Progressing    Plan   Cont OT to address above goals.        NICO Barros OTR/L, CHT

## 2024-12-13 ENCOUNTER — CLINICAL SUPPORT (OUTPATIENT)
Dept: REHABILITATION | Facility: HOSPITAL | Age: 59
End: 2024-12-13
Payer: MEDICAID

## 2024-12-13 DIAGNOSIS — R60.0 LOCALIZED EDEMA: Primary | ICD-10-CM

## 2024-12-13 DIAGNOSIS — R52 PAIN: ICD-10-CM

## 2024-12-13 PROCEDURE — 97530 THERAPEUTIC ACTIVITIES: CPT

## 2024-12-23 NOTE — PROGRESS NOTES
Occupational Therapy Daily Treatment Note     Date: 12/24/2024  Name: Michelle Mendoza  Clinic Number: [id number]    Therapy Diagnosis:   Encounter Diagnoses   Name Primary?    Localized edema Yes    Pain                  Physician: Phuc Bolaños PA    Medical Diagnosis: Right Hand Pain  Physician Orders:   Evaluation Date: 10/31/2024  Plan of Care Certification Date: 1/31/2025  Authorization Period: 10/18/2025  Surgery Date and Procedure: LF trigger release x 5 months  Date of Return to MD: JERICHO     Visit #:10 of 12  Time In: 10:44 AM  Time Out: 11:24 AM  Total Billable Time: 40 min     Precautions: Standard,       Subjective     Pt reports: feeling better, no  used per pt decline   Response to previous treatment:Mary well    Pain: 6/10  Location: Dorsum right hand    Objective     Michelle participated in dynamic functional therapeutic activities to improve functional performance for 40 minutes, including:  -Fluidotherapy  -AROM comp fists, lifts, spreads 20  -Isospheres 3'  -Towel scrunches 3'  -Med pom poms yellow CP (NT)  -Rolling on green flexbar 3'  -Coins 3 container  - B tennis ball toss x 3 min   -Yellow putty presses 2' (NT_   -red flexbar frowns and smiles x 10   -Michelle  received a 4 hour extended wear Iontophoresis patch for pain control and decreased inflammation with 1.0cc Dexamethasone applied to right volar FA-hand. Patient educated on wear time and precautions voicing  understanding and compliance. 2/6    12/3/2024    Range of Motion:   Right     Finger ROM    INDEX   LONG   RING   SMALL     MP  0/78 =/-8 0/82 =/-3 0/70 =/-5 0/86 =/+11   PIP  0/78 =/-11 0/68 =/-12 0/92 =/+2 0/95 =/+5   DIP  0/45 =/+15 0/40 =/= 0/45 =/-1 0/45 =/-11   PATRICIO 201 -3 190 -23 201 -10 200 -21         Strength: (Measured in psi)     Rung II 20 =         Pinch Strength (Measured in psi)     Key Pinch 8 -1   3pt Pinch 4 =        Home Exercises and Education Provided     Education provided:   - Progress towards  goals     Written Home Exercises Provided: Patient instructed to cont prior HEP.  Exercises were reviewed and Michelle was able to demonstrate them prior to the end of the session.  Michelle demonstrated good  understanding of the HEP provided.   .   See EMR under Patient Instructions for exercises provided prior visit.        Assessment     Reports of improved pain.   Michelle is progressing towards her goals and there are no updates to goals at this time. Pt prognosis is Guarded.     Pt will continue to benefit from skilled outpatient occupational therapy to address the deficits listed in the problem list on initial evaluation provide pt/family education and to maximize pt's level of function.     Anticipated barriers to therapy: Length of time since Sx w/ little to no improvement       Pt's spiritual, cultural and educational needs considered and pt agreeable to plan of care and goals.    Goals:  LTG's (8 weeks):  1)   Increase PATRICIO IF/LF 20 degrees in to increase functional hand use for ADL's. Progressing  2)   Increase  strength 10 lbs. to grasp pot handle. Progressing  3)   Increase 3J pinch 3 psis for opening bags. Progressing  4)   Decrease complaints of pain to  3 out of 10 at worst to increase functional hand use for ADL/work/leisure activities. Progressing  5)   Pt will return to near to prior level of function for ADLs and household management reporting I or Mod I with ADLs (dressing, feeding, grooming, toileting). Progressing     STG's (4 weeks)  1)   Patient to be IND with HEP and modalities for pain/edema managment. Progressing  2)   Increase PATRICIO IF/LF 10 degrees in to increase functional hand use for ADL's. Progressing  3)   Increase  strength 5 lbs. to grasp pot handle. Progressing  4)   Increase 3J pinch 1 psis for opening bags. Progressing  5)   Patient to be IND with Orthotic use, wear and care precautions. Progressing  6)   Decrease complaints of pain to  4 out of 10 at worst to increase  functional hand use for ADL/work/leisure activities. Progressing    Plan   Cont OT to address above goals.        AYESHA Mehta/L

## 2024-12-24 ENCOUNTER — CLINICAL SUPPORT (OUTPATIENT)
Dept: REHABILITATION | Facility: HOSPITAL | Age: 59
End: 2024-12-24
Payer: MEDICAID

## 2024-12-24 DIAGNOSIS — R60.0 LOCALIZED EDEMA: Primary | ICD-10-CM

## 2024-12-24 DIAGNOSIS — R52 PAIN: ICD-10-CM

## 2024-12-24 PROCEDURE — 97530 THERAPEUTIC ACTIVITIES: CPT | Mod: CO

## 2025-02-03 ENCOUNTER — CLINICAL SUPPORT (OUTPATIENT)
Dept: REHABILITATION | Facility: HOSPITAL | Age: 60
End: 2025-02-03
Payer: MEDICAID

## 2025-02-03 DIAGNOSIS — R60.0 LOCALIZED EDEMA: Primary | ICD-10-CM

## 2025-02-03 DIAGNOSIS — R52 PAIN: ICD-10-CM

## 2025-02-03 PROCEDURE — 97530 THERAPEUTIC ACTIVITIES: CPT

## 2025-02-03 NOTE — PATIENT INSTRUCTIONS
"[facility] - OCCUPATIONAL THERAPY  HOME EXERCISE PROGRAM     Complete the following massages for 2-3 minutes each, 4x/day.                       Complete the following exercises with 10-15 repetitions each, 3-4x/day.     AROM: DIP Flexion / Extension  Pinch middle knuckle to prevent bending. Bend end knuckle until stretch is felt.   Hold 3 seconds. Relax. Straighten finger as far as possible.        AROM: Isolated MCP Flexion / Extension ("Wave")   Bend only your large, bottom knuckles. Hold 3 seconds. Keep the tips of your fingers straight. Straighten fingers.      AROM: MCP and PIP Flexion / Extension ("Straight Fist")  Bend your bottom and middle knuckles, keeping the tips of your fingers straight. Try to touch the pads of your fingers on your palm. Hold 3 seconds. Straighten your fingers.         AROM: Composte Extension ("Finger Lifts")  Lift your finger off of the table one at a time. Hold 3 seconds. Relax your finger.    AROM: Abduction / Adduction  With hand flat on table, spread all fingers apart, then bring them together as close as possible.    Copyright © [employer]. All rights reserved.     Therapist: YOVANA Marquez/BRODY     "

## 2025-02-03 NOTE — PROGRESS NOTES
Occupational Therapy Daily Note      Date: 2/3/2025  Name: Michelle Mendoza  [facility] Number: [id number]    Therapy Diagnosis:   Encounter Diagnoses   Name Primary?    Localized edema Yes    Pain          Physician: Phuc Bolaños PA    Medical Diagnosis: Right Hand Pain  Physician Orders:   Evaluation Date: 10/31/2024  Plan of Care Certification Date: 2/28/2025  Authorization Period: 10/18/2025  Surgery Date and Procedure: LF trigger release x 5 months  Date of Return to MD: JERICHO     Visit #:1 of 11 (+11)  Time In: 04:50 PM  Time Out: 05:40 AM  Total Billable Time: 40 min     Precautions: Standard,       Subjective     Pt reports:  no  used per pt decline. Saw PA on 10/17/2024.    Response to previous treatment:Mary well    Pain: 6/10  Location: Dorsum right hand    Objective       Michelle participated in dynamic functional therapeutic activities to improve functional performance for 40 minutes, including:  - updated objective measurements, please see above   - Fluidotherapy  - AROM comp fists, lifts, spreads 20  - Isospheres 3'  -Towel scrunches 3'  - Med pom poms yellow CP (NT)  - Rolling on green flexbar 3'  - Coins 3 container  - B tennis ball toss x 3 min   -Yellow putty presses 2' (NT_   -red flexbar frowns and smiles x 10   -Michelle  received a 4 hour extended wear Iontophoresis patch for pain control and decreased inflammation with 1.0cc Dexamethasone applied to right volar FA-hand. Patient educated on wear time and precautions voicing  understanding and compliance. 2/6 (NT)      12/3/2024    Range of Motion:   Right     Finger ROM    INDEX   LONG   LONG  2/3/2025 RING   SMALL      MP  0/78 =/-8 0/82 =/-3 85 0/70 =/-5 0/86 =/+11    PIP  0/78 =/-11 0/68 =/-12 74 0/92 =/+2 0/95 =/+5    DIP  0/45 =/+15 0/40 =/= 44 0/45 =/-1 0/45 =/-11    PATRICIO 201 -3 190 -23  201 -10 200 -21              Strength: (Measured in psi)     Rung II 20 = 2/3/2025   14.5          Pinch Strength (Measured in psi)     Key  Pinch 8 -1   3pt Pinch 4 =        Home Exercises and Education Provided     Education provided:   - Progress towards goals     Written Home Exercises Provided: Patient instructed to cont prior HEP.  Exercises were reviewed and Michelle was able to demonstrate them prior to the end of the session.  Michelle demonstrated good  understanding of the HEP provided.   .   See EMR under Patient Instructions for exercises provided prior visit.        Assessment     Reports of improved pain.  Decreased  strength. Progressed pt to geno Martinez is progressing towards her goals and there are no updates to goals at this time. Pt prognosis is Guarded.     Pt will continue to benefit from skilled outpatient occupational therapy to address the deficits listed in the problem list on initial evaluation provide pt/family education and to maximize pt's level of function.     Anticipated barriers to therapy: Length of time since Sx w/ little to no improvement       Pt's spiritual, cultural and educational needs considered and pt agreeable to plan of care and goals.    Goals:  LTG's (8 weeks):  1)   Increase PATRICIO IF/LF 20 degrees in to increase functional hand use for ADL's. Progressing  2)   Increase  strength 10 lbs. to grasp pot handle. Progressing  3)   Increase 3J pinch 3 psis for opening bags. Progressing  4)   Decrease complaints of pain to  3 out of 10 at worst to increase functional hand use for ADL/work/leisure activities. Progressing  5)   Pt will return to near to prior level of function for ADLs and household management reporting I or Mod I with ADLs (dressing, feeding, grooming, toileting). Progressing     STG's (4 weeks)  1)   Patient to be IND with HEP and modalities for pain/edema managment. Progressing  2)   Increase PATRICIO IF/LF 10 degrees in to increase functional hand use for ADL's. Progressing  3)   Increase  strength 5 lbs. to grasp pot handle. Progressing  4)   Increase 3J pinch 1 psis for opening  bags. Progressing  5)   Patient to be IND with Orthotic use, wear and care precautions. Progressing  6)   Decrease complaints of pain to  4 out of 10 at worst to increase functional hand use for ADL/work/leisure activities. Progressing    PLAN         Dariela Katz, OT        Dariela Katz, OT

## 2025-02-10 ENCOUNTER — CLINICAL SUPPORT (OUTPATIENT)
Dept: REHABILITATION | Facility: HOSPITAL | Age: 60
End: 2025-02-10
Payer: MEDICAID

## 2025-02-10 DIAGNOSIS — R52 PAIN: ICD-10-CM

## 2025-02-10 DIAGNOSIS — R60.0 LOCALIZED EDEMA: Primary | ICD-10-CM

## 2025-02-10 PROCEDURE — 97530 THERAPEUTIC ACTIVITIES: CPT

## 2025-02-10 NOTE — PROGRESS NOTES
"  Occupational Therapy Daily Updated POC      Date: 2/10/2025  Name: Michelle Mendoza  Clinic Number: [id number]    Therapy Diagnosis:   Encounter Diagnoses   Name Primary?    Localized edema Yes    Pain      Physician: Phuc Bolaños PA    Medical Diagnosis: Right Hand Pain  Physician Orders:   Evaluation Date: 10/31/2024  Plan of Care Certification Date: 2/28/2025  Authorization Period: 10/18/2025  Surgery Date and Procedure: LF trigger release x 5 months  Date of Return to MD: JERICHO     Visit #: 2 of 11 (+11)  Time In: 4:55 pm  Time Out: 5:35 pm  Total Billable Time: 40 min     Precautions: Standard,       Subjective     Pt reports:  "Its painful here" *points to palm  Response to previous treatment:Mary well    Pain: 8/10  Location: Dorsum right hand    Objective       Michelle participated in dynamic functional therapeutic activities to improve functional performance for 40 minutes, including:  - Fluidotherapy  - scar mob  - AROM comp fists, lifts, spreads 20  - Isospheres 3'  - yellow RB finger ABD   - wrist ROM 3 ways 1#  - Med pom poms yellow CP   - Rolling on green flexbar 3'  - finger<>palm translation with small poms   - pink putty intrinsic scrape x 2 min   -red flexbar frowns and smiles x 10   - dowel rolls hook<>fist 2/10    Not today:  -Michelle  received a 4 hour extended wear Iontophoresis patch for pain control and decreased inflammation with 1.0cc Dexamethasone applied to right volar FA-hand. Patient educated on wear time and precautions voicing  understanding and compliance. 2/6 (NT)      12/3/2024    Range of Motion:   Right     Finger ROM    INDEX   LONG   LONG  2/3/2025 RING   SMALL      MP  0/78 =/-8 0/82 =/-3 85 0/70 =/-5 0/86 =/+11    PIP  0/78 =/-11 0/68 =/-12 74 0/92 =/+2 0/95 =/+5    DIP  0/45 =/+15 0/40 =/= 44 0/45 =/-1 0/45 =/-11    PATRICIO 201 -3 190 -23  201 -10 200 -21              Strength: (Measured in psi)     Rung II 20 = 2/3/2025   14.5          Pinch Strength (Measured in psi)   "   Key Pinch 8 -1   3pt Pinch 4 =        Home Exercises and Education Provided     Education provided:   - Progress towards goals     Written Home Exercises Provided: Patient instructed to cont prior HEP.  Exercises were reviewed and Michelle was able to demonstrate them prior to the end of the session.  Michelle demonstrated good  understanding of the HEP provided.   .   See EMR under Patient Instructions for exercises provided prior visit.        Assessment     Patient returns to therapy after 2 months of not coming to tx sessions. Upon arrival, patient states that she is still having a bit of pain and stiffness in the R hand. She did well with established activities as well as new ones added. ROM improved following ax today, but reports pain afterwards.Continue skilled OT POC and progress per protocol as tolerated.     Michelle is progressing towards her goals and there are no updates to goals at this time. Pt prognosis is Guarded.     Pt will continue to benefit from skilled outpatient occupational therapy to address the deficits listed in the problem list on initial evaluation provide pt/family education and to maximize pt's level of function.     Anticipated barriers to therapy: Length of time since Sx w/ little to no improvement       Pt's spiritual, cultural and educational needs considered and pt agreeable to plan of care and goals.    Goals:  LTG's (8 weeks):  1)   Increase PATRICIO IF/LF 20 degrees in to increase functional hand use for ADL's. Progressing  2)   Increase  strength 10 lbs. to grasp pot handle. Progressing  3)   Increase 3J pinch 3 psis for opening bags. Progressing  4)   Decrease complaints of pain to  3 out of 10 at worst to increase functional hand use for ADL/work/leisure activities. Progressing  5)   Pt will return to near to prior level of function for ADLs and household management reporting I or Mod I with ADLs (dressing, feeding, grooming, toileting). Progressing     STG's (4 weeks)  1)    Patient to be IND with HEP and modalities for pain/edema managment. Progressing  2)   Increase PATRICIO IF/LF 10 degrees in to increase functional hand use for ADL's. Progressing  3)   Increase  strength 5 lbs. to grasp pot handle. Progressing  4)   Increase 3J pinch 1 psis for opening bags. Progressing  5)   Patient to be IND with Orthotic use, wear and care precautions. Progressing  6)   Decrease complaints of pain to  4 out of 10 at worst to increase functional hand use for ADL/work/leisure activities. Progressing    Plan   Cont OT to address above goals.    Tabby Smith, OT

## 2025-02-17 ENCOUNTER — CLINICAL SUPPORT (OUTPATIENT)
Dept: REHABILITATION | Facility: HOSPITAL | Age: 60
End: 2025-02-17
Payer: MEDICAID

## 2025-02-17 DIAGNOSIS — M79.641 RIGHT HAND PAIN: Primary | ICD-10-CM

## 2025-02-17 PROCEDURE — 97530 THERAPEUTIC ACTIVITIES: CPT

## 2025-02-17 NOTE — PROGRESS NOTES
"  Occupational Therapy Daily Updated POC      Date: 2/17/2025  Name: Michelle Mendoza  Clinic Number: [id number]    Therapy Diagnosis:   Encounter Diagnosis   Name Primary?    Right hand pain Yes       Physician: Phuc Bolaños PA    Medical Diagnosis: Right Hand Pain  Physician Orders:   Evaluation Date: 10/31/2024  Plan of Care Certification Date: 2/28/2025  Authorization Period: 10/18/2025  Surgery Date and Procedure: LF trigger release x 5 months  Date of Return to MD: JERICHO     Visit #: 2 of 11 (+11)  Time In: 4:55 pm  Time Out: 5:35 pm  Total Billable Time: 40 min     Precautions: Standard,       Subjective     Pt reports:  "Its painful here" *points to palm  Response to previous treatment:Mary well    Pain: 8/10  Location: Dorsum right hand    Objective       Michelle participated in dynamic functional therapeutic activities to improve functional performance for 40 minutes, including:  - Fluidotherapy  - scar mob  - AROM comp fists, lifts, spreads 20  - Isospheres 3'  - yellow RB finger ABD   - wrist ROM 3 ways 1#  - Med pom poms yellow CP   - Rolling on green flexbar 3'  - finger<>palm translation with small poms   - pink putty intrinsic scrape x 2 min   -red flexbar frowns and smiles x 10   - dowel rolls hook<>fist 2/10    Not today:  -Michelle  received a 4 hour extended wear Iontophoresis patch for pain control and decreased inflammation with 1.0cc Dexamethasone applied to right volar FA-hand. Patient educated on wear time and precautions voicing  understanding and compliance. 2/6 (NT)      12/3/2024    Range of Motion:   Right     Finger ROM    INDEX   LONG   LONG  2/3/2025 RING   SMALL      MP  0/78 =/-8 0/82 =/-3 85 0/70 =/-5 0/86 =/+11    PIP  0/78 =/-11 0/68 =/-12 74 0/92 =/+2 0/95 =/+5    DIP  0/45 =/+15 0/40 =/= 44 0/45 =/-1 0/45 =/-11    PATRICIO 201 -3 190 -23  201 -10 200 -21              Strength: (Measured in psi)     Rung II 20 = 2/3/2025   14.5          Pinch Strength (Measured in psi)     Key " Pinch 8 -1   3pt Pinch 4 =        Home Exercises and Education Provided     Education provided:   - Progress towards goals     Written Home Exercises Provided: Patient instructed to cont prior HEP.  Exercises were reviewed and Michelle was able to demonstrate them prior to the end of the session.  Michelle demonstrated good  understanding of the HEP provided.   .   See EMR under Patient Instructions for exercises provided prior visit.        Assessment      Upon arrival, patient states that she is still having a bit of pain and stiffness in the R hand. She did well with established activities as well as new ones added. ROM improved following ax today, but reports pain afterwards.Continue skilled OT POC and progress per protocol as tolerated.     Michelle is progressing towards her goals and there are no updates to goals at this time. Pt prognosis is Guarded.     Pt will continue to benefit from skilled outpatient occupational therapy to address the deficits listed in the problem list on initial evaluation provide pt/family education and to maximize pt's level of function.     Anticipated barriers to therapy: Length of time since Sx w/ little to no improvement       Pt's spiritual, cultural and educational needs considered and pt agreeable to plan of care and goals.    Goals:  LTG's (8 weeks):  1)   Increase PATRICIO IF/LF 20 degrees in to increase functional hand use for ADL's. Progressing  2)   Increase  strength 10 lbs. to grasp pot handle. Progressing  3)   Increase 3J pinch 3 psis for opening bags. Progressing  4)   Decrease complaints of pain to  3 out of 10 at worst to increase functional hand use for ADL/work/leisure activities. Progressing  5)   Pt will return to near to prior level of function for ADLs and household management reporting I or Mod I with ADLs (dressing, feeding, grooming, toileting). Progressing     STG's (4 weeks)  1)   Patient to be IND with HEP and modalities for pain/edema managment.  Progressing  2)   Increase PATRICIO IF/LF 10 degrees in to increase functional hand use for ADL's. Progressing  3)   Increase  strength 5 lbs. to grasp pot handle. Progressing  4)   Increase 3J pinch 1 psis for opening bags. Progressing  5)   Patient to be IND with Orthotic use, wear and care precautions. Progressing  6)   Decrease complaints of pain to  4 out of 10 at worst to increase functional hand use for ADL/work/leisure activities. Progressing        Previous Short Term Goals Status:   progressing    Long Term Goal Status: continue per initial plan of care.  Reasons for Recertification of Therapy:   cont pain and hand weakness           PLAN     Updated Certification Period: 2/28/2025 to 4/25/2025   Recommended Treatment Plan: 1-2 times per week for 8 weeks:  Fluidotherapy, Iontophoresis (with dexamethazone), Manual Therapy, Moist Heat/ Ice, Neuromuscular Re-ed, Orthotic Management and Training, Paraffin, Patient Education, Self Care, Therapeutic Activities, Therapeutic Exercise, and Ultrasound  Other Recommendations:     Dariela Katz, OT

## 2025-02-19 ENCOUNTER — PATIENT MESSAGE (OUTPATIENT)
Dept: REHABILITATION | Facility: HOSPITAL | Age: 60
End: 2025-02-19
Payer: MEDICAID

## 2025-03-06 ENCOUNTER — CLINICAL SUPPORT (OUTPATIENT)
Dept: REHABILITATION | Facility: HOSPITAL | Age: 60
End: 2025-03-06
Payer: MEDICAID

## 2025-03-06 DIAGNOSIS — R52 PAIN: ICD-10-CM

## 2025-03-06 DIAGNOSIS — R60.0 LOCALIZED EDEMA: Primary | ICD-10-CM

## 2025-03-06 PROCEDURE — 97530 THERAPEUTIC ACTIVITIES: CPT | Mod: CO

## 2025-03-06 NOTE — PROGRESS NOTES
Occupational Therapy Daily Updated POC      Date: 3/6/2025  Name: Michelle Mendoza  Clinic Number: [id number]    Therapy Diagnosis:   Encounter Diagnoses   Name Primary?    Localized edema Yes    Pain          Physician: Phuc Bolaños PA    Medical Diagnosis: Right Hand Pain  Physician Orders:   Evaluation Date: 10/31/2024  Plan of Care Certification Date: 2/28/2025  Authorization Period: 10/18/2025  Surgery Date and Procedure: LF trigger release x 5 months  Date of Return to MD: JERICHO     Visit #: 4 of 11 (+11)  Time In: 1:40 pm  Time Out: 2:20 pm  Total Billable Time: 40 min     Precautions: Standard,       Subjective     Pt reports:  improvement with pain   Response to previous treatment:Mary well    Pain: not formally rated however denied at start of session, c/o pain in dorsal PIP during exercise  Location: Dorsum right hand    Objective       Michelle participated in dynamic functional therapeutic activities to improve functional performance for 40 minutes, including:  - Fluidotherapy  - scar mob  - AROM comp fists, lifts, spreads 20  - Isospheres 3'  - yellow RB finger ABD   - wrist ROM 3 ways 1#  - Med pom poms yellow CP   - Rolling on green flexbar 3'  - finger<>palm translation with small poms   - pink putty intrinsic scrape x 2 min   -red flexbar frowns and smiles x 10   - dowel rolls hook<>fist 2/10  -red web pushes and pulls x 20 ea     Not today:  -Michelle  received a 4 hour extended wear Iontophoresis patch for pain control and decreased inflammation with 1.0cc Dexamethasone applied to right volar FA-hand. Patient educated on wear time and precautions voicing  understanding and compliance. 2/6 (NT)      12/3/2024    Range of Motion:   Right     Finger ROM    INDEX   LONG   LONG  2/3/2025 RING   SMALL      MP  0/78 =/-8 0/82 =/-3 85 0/70 =/-5 0/86 =/+11    PIP  0/78 =/-11 0/68 =/-12 74 0/92 =/+2 0/95 =/+5    DIP  0/45 =/+15 0/40 =/= 44 0/45 =/-1 0/45 =/-11    PATRICIO 201 -3 190 -23  201 -10 200 -21               Strength: (Measured in psi)     Rung II 20 = 2/3/2025   14.5          Pinch Strength (Measured in psi)     Key Pinch 8 -1   3pt Pinch 4 =        Home Exercises and Education Provided     Education provided:   - Progress towards goals     Written Home Exercises Provided: Patient instructed to cont prior HEP.  Exercises were reviewed and Michelle was able to demonstrate them prior to the end of the session.  Michelle demonstrated good  understanding of the HEP provided.   .   See EMR under Patient Instructions for exercises provided prior visit.        Assessment     Pt tolerated session fairly well today. C/o dorsal PIP pain during intrinsic dowel exercise.Continue skilled OT POC and progress per protocol as tolerated.     Michelle is progressing towards her goals and there are no updates to goals at this time. Pt prognosis is Guarded.     Pt will continue to benefit from skilled outpatient occupational therapy to address the deficits listed in the problem list on initial evaluation provide pt/family education and to maximize pt's level of function.     Anticipated barriers to therapy: Length of time since Sx w/ little to no improvement       Pt's spiritual, cultural and educational needs considered and pt agreeable to plan of care and goals.    Goals:  LTG's (8 weeks):  1)   Increase PATRICIO IF/LF 20 degrees in to increase functional hand use for ADL's. Progressing  2)   Increase  strength 10 lbs. to grasp pot handle. Progressing  3)   Increase 3J pinch 3 psis for opening bags. Progressing  4)   Decrease complaints of pain to  3 out of 10 at worst to increase functional hand use for ADL/work/leisure activities. Progressing  5)   Pt will return to near to prior level of function for ADLs and household management reporting I or Mod I with ADLs (dressing, feeding, grooming, toileting). Progressing     STG's (4 weeks)  1)   Patient to be IND with HEP and modalities for pain/edema managment. Progressing  2)    Increase PATRICIO IF/LF 10 degrees in to increase functional hand use for ADL's. Progressing  3)   Increase  strength 5 lbs. to grasp pot handle. Progressing  4)   Increase 3J pinch 1 psis for opening bags. Progressing  5)   Patient to be IND with Orthotic use, wear and care precautions. Progressing  6)   Decrease complaints of pain to  4 out of 10 at worst to increase functional hand use for ADL/work/leisure activities. Progressing        Previous Short Term Goals Status:   progressing    Long Term Goal Status: continue per initial plan of care.  Reasons for Recertification of Therapy:   cont pain and hand weakness           PLAN     Updated Certification Period: 2/28/2025 to 4/25/2025   Recommended Treatment Plan: 1-2 times per week for 8 weeks:  Fluidotherapy, Iontophoresis (with dexamethazone), Manual Therapy, Moist Heat/ Ice, Neuromuscular Re-ed, Orthotic Management and Training, Paraffin, Patient Education, Self Care, Therapeutic Activities, Therapeutic Exercise, and Ultrasound  Other Recommendations:     AYESHA Mehta/BRODY

## 2025-03-10 ENCOUNTER — CLINICAL SUPPORT (OUTPATIENT)
Dept: REHABILITATION | Facility: HOSPITAL | Age: 60
End: 2025-03-10
Payer: MEDICAID

## 2025-03-10 DIAGNOSIS — R52 PAIN: ICD-10-CM

## 2025-03-10 DIAGNOSIS — R60.0 LOCALIZED EDEMA: Primary | ICD-10-CM

## 2025-03-10 PROCEDURE — 97530 THERAPEUTIC ACTIVITIES: CPT | Mod: CO

## 2025-03-10 NOTE — PROGRESS NOTES
Occupational Therapy Daily Updated POC      Date: 3/10/2025  Name: Michelle Mendoza  Clinic Number: [id number]    Therapy Diagnosis:   Encounter Diagnoses   Name Primary?    Localized edema Yes    Pain            Physician: Phuc Bolaños PA    Medical Diagnosis: Right Hand Pain  Physician Orders:   Evaluation Date: 10/31/2024  Plan of Care Certification Date: 4/25/2025  Authorization Period: 10/18/2025  Surgery Date and Procedure: LF trigger release x 5 months  Date of Return to MD: TBE     Visit #: 4 of 11 (+11)  Time In: 5:15 pm  Time Out: 5:57 pm  Total Billable Time: 42 min     Precautions: Standard,       Subjective     Pt reports:  tried to hold her baby niece and almost dropped her due to weakness   Response to previous treatment:Mary well    Pain: not formally rated however denied at start of session, c/o pain in dorsal PIP during exercise  Location: Dorsum right hand    Objective       Michelle participated in dynamic functional therapeutic activities to improve functional performance for 42 minutes, including:  - Fluidotherapy  - AROM comp fists, lifts, spreads 20  - Isospheres 3'  - yellow RB finger ABD   - wrist ROM 3 ways 2# 3 x 10   - bicep curls 2 # 2 x 10   - Med pom poms red  CP   - finger<>palm translation with small poms  (NT)   - pink putty intrinsic scrape x 2 min  (NT)   -red flexbar frowns and smiles, flex/ext twists  x 20   -red web pushes and pulls x 20 ea     Not today:  -Michelle  received a 4 hour extended wear Iontophoresis patch for pain control and decreased inflammation with 1.0cc Dexamethasone applied to right volar FA-hand. Patient educated on wear time and precautions voicing  understanding and compliance. 2/6 (NT)      12/3/2024    Range of Motion:   Right     Finger ROM    INDEX   LONG   LONG  2/3/2025 RING   SMALL      MP  0/78 =/-8 0/82 =/-3 85 0/70 =/-5 0/86 =/+11    PIP  0/78 =/-11 0/68 =/-12 74 0/92 =/+2 0/95 =/+5    DIP  0/45 =/+15 0/40 =/= 44 0/45 =/-1 0/45 =/-11    PATRICIO 201  -3 190 -23  201 -10 200 -21              Strength: (Measured in psi)     Rung II 20 = 2/3/2025   14.5          Pinch Strength (Measured in psi)     Key Pinch 8 -1   3pt Pinch 4 =        Home Exercises and Education Provided     Education provided:   - Progress towards goals     Written Home Exercises Provided: Patient instructed to cont prior HEP.  Exercises were reviewed and Michelle was able to demonstrate them prior to the end of the session.  Michelle demonstrated good  understanding of the HEP provided.   .   See EMR under Patient Instructions for exercises provided prior visit.        Assessment     Advanced with strengthening exercises today and pt tolerated well with mild fatigue. Will reassess measures next time.     Michelle is progressing towards her goals and there are no updates to goals at this time. Pt prognosis is Guarded.     Pt will continue to benefit from skilled outpatient occupational therapy to address the deficits listed in the problem list on initial evaluation provide pt/family education and to maximize pt's level of function.     Anticipated barriers to therapy: Length of time since Sx w/ little to no improvement       Pt's spiritual, cultural and educational needs considered and pt agreeable to plan of care and goals.    Goals:  LTG's (8 weeks):  1)   Increase PATRICIO IF/LF 20 degrees in to increase functional hand use for ADL's. Progressing  2)   Increase  strength 10 lbs. to grasp pot handle. Progressing  3)   Increase 3J pinch 3 psis for opening bags. Progressing  4)   Decrease complaints of pain to  3 out of 10 at worst to increase functional hand use for ADL/work/leisure activities. Progressing  5)   Pt will return to near to prior level of function for ADLs and household management reporting I or Mod I with ADLs (dressing, feeding, grooming, toileting). Progressing     STG's (4 weeks)  1)   Patient to be IND with HEP and modalities for pain/edema managment. Progressing  2)   Increase  PATRICIO IF/LF 10 degrees in to increase functional hand use for ADL's. Progressing  3)   Increase  strength 5 lbs. to grasp pot handle. Progressing  4)   Increase 3J pinch 1 psis for opening bags. Progressing  5)   Patient to be IND with Orthotic use, wear and care precautions. Progressing  6)   Decrease complaints of pain to  4 out of 10 at worst to increase functional hand use for ADL/work/leisure activities. Progressing        Previous Short Term Goals Status:   progressing    Long Term Goal Status: continue per initial plan of care.  Reasons for Recertification of Therapy:   cont pain and hand weakness           PLAN     Updated Certification Period: 2/28/2025 to 4/25/2025   Recommended Treatment Plan: 1-2 times per week for 8 weeks:  Fluidotherapy, Iontophoresis (with dexamethazone), Manual Therapy, Moist Heat/ Ice, Neuromuscular Re-ed, Orthotic Management and Training, Paraffin, Patient Education, Self Care, Therapeutic Activities, Therapeutic Exercise, and Ultrasound  Other Recommendations:     AYESHA Mehta/BRODY

## 2025-03-17 ENCOUNTER — CLINICAL SUPPORT (OUTPATIENT)
Dept: REHABILITATION | Facility: HOSPITAL | Age: 60
End: 2025-03-17
Payer: MEDICAID

## 2025-03-17 DIAGNOSIS — R60.0 LOCALIZED EDEMA: Primary | ICD-10-CM

## 2025-03-17 DIAGNOSIS — R52 PAIN: ICD-10-CM

## 2025-03-17 PROCEDURE — 97530 THERAPEUTIC ACTIVITIES: CPT

## 2025-03-20 NOTE — PROGRESS NOTES
Occupational Therapy Daily Updated POC       Date: 3/20/2025  Name: Michelle Mendoza  Clinic Number: [id number]     Therapy Diagnosis:        Encounter Diagnoses   Name Primary?    Localized edema Yes    Pain                 Physician: Phuc Bolaños PA     Medical Diagnosis: Right Hand Pain  Physician Orders:   Evaluation Date: 10/31/2024  Plan of Care Certification Date: 4/25/2025  Authorization Period: 10/18/2025  Surgery Date and Procedure: LF trigger release x 5 months  Date of Return to MD: JERICHO     Visit #: 6 of 11 (+11)  Time In: 3:00 pm  Time Out: 3:45 pm  Total Billable Time: 45 min     Precautions: Standard,         Subjective      Pt reports:  No reports of pain   Response to previous treatment:Mary well     Pain: not formally rated however denied at start of session, c/o pain in dorsal PIP during exercise  Location: Dorsum right hand     Objective         Michelle participated in dynamic functional therapeutic activities to improve functional performance for 45 minutes, including:  - Fluidotherapy  - AROM comp fists, lifts, spreads 20  - Isospheres 3'  - yellow RB finger ABD   - wrist ROM 3 ways 2# 3 x 10   - bicep curls 2 # 2 x 10   - Med pom poms red  CP   - finger<>palm translation with small poms  (NT)   - pink putty intrinsic scrape x 2 min  (NT)   -red flexbar frowns and smiles, flex/ext twists  x 20   -red web pushes and pulls x 20 ea      Not today:  -Michelle  received a 4 hour extended wear Iontophoresis patch for pain control and decreased inflammation with 1.0cc Dexamethasone applied to right volar FA-hand. Patient educated on wear time and precautions voicing  understanding and compliance. 2/6 (NT)        12/3/2024    Range of Motion:   Right     Finger ROM    INDEX    LONG    LONG  2/3/2025 RING    SMALL        MP  0/78 =/-8 0/82 =/-3 85 0/70 =/-5 0/86 =/+11     PIP  0/78 =/-11 0/68 =/-12 74 0/92 =/+2 0/95 =/+5     DIP  0/45 =/+15 0/40 =/= 44 0/45 =/-1 0/45 =/-11     PATRICIO 201 -3 190 -23   201  -10 200 -21                             Strength: (Measured in psi)     Rung II 20 = 2/3/2025   14.5          Pinch Strength (Measured in psi)     Key Pinch 8 -1   3pt Pinch 4 =         Home Exercises and Education Provided      Education provided:   - Progress towards goals      Written Home Exercises Provided: Patient instructed to cont prior HEP.  Exercises were reviewed and Michelle was able to demonstrate them prior to the end of the session.  Michelle demonstrated good  understanding of the HEP provided.   .   See EMR under Patient Instructions for exercises provided prior visit.         Assessment      Pt. tolerated treatment session well. Pt. indicated min. pain in elbow during therapeutic exercises/activities.      Michelle is progressing towards her goals and there are no updates to goals at this time. Pt prognosis is Guarded.      Pt will continue to benefit from skilled outpatient occupational therapy to address the deficits listed in the problem list on initial evaluation provide pt/family education and to maximize pt's level of function.      Anticipated barriers to therapy: Length of time since Sx w/ little to no improvement         Pt's spiritual, cultural and educational needs considered and pt agreeable to plan of care and goals.     Goals:  LTG's (8 weeks):  1)   Increase PATRICIO IF/LF 20 degrees in to increase functional hand use for ADL's. Progressing  2)   Increase  strength 10 lbs. to grasp pot handle. Progressing  3)   Increase 3J pinch 3 psis for opening bags. Progressing  4)   Decrease complaints of pain to  3 out of 10 at worst to increase functional hand use for ADL/work/leisure activities. Progressing  5)   Pt will return to near to prior level of function for ADLs and household management reporting I or Mod I with ADLs (dressing, feeding, grooming, toileting). Progressing     STG's (4 weeks)  1)   Patient to be IND with HEP and modalities for pain/edema managment. Progressing  2)    Increase PATRICIO IF/LF 10 degrees in to increase functional hand use for ADL's. Progressing  3)   Increase  strength 5 lbs. to grasp pot handle. Progressing  4)   Increase 3J pinch 1 psis for opening bags. Progressing  5)   Patient to be IND with Orthotic use, wear and care precautions. Progressing  6)   Decrease complaints of pain to  4 out of 10 at worst to increase functional hand use for ADL/work/leisure activities. Progressing           Previous Short Term Goals Status:   progressing    Long Term Goal Status: continue per initial plan of care.  Reasons for Recertification of Therapy:   cont pain and hand weakness               PLAN      Updated Certification Period: 2/28/2025 to 4/25/2025   Recommended Treatment Plan: 1-2 times per week for 8 weeks:  Fluidotherapy, Iontophoresis (with dexamethazone), Manual Therapy, Moist Heat/ Ice, Neuromuscular Re-ed, Orthotic Management and Training, Paraffin, Patient Education, Self Care, Therapeutic Activities, Therapeutic Exercise, and Ultrasound  Other Recommendations:      Celso Irene OTR/L

## 2025-05-01 ENCOUNTER — PATIENT MESSAGE (OUTPATIENT)
Dept: REHABILITATION | Facility: HOSPITAL | Age: 60
End: 2025-05-01
Payer: MEDICAID